# Patient Record
Sex: FEMALE | Race: WHITE | Employment: STUDENT | URBAN - METROPOLITAN AREA
[De-identification: names, ages, dates, MRNs, and addresses within clinical notes are randomized per-mention and may not be internally consistent; named-entity substitution may affect disease eponyms.]

---
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
## 2019-01-06 ENCOUNTER — HOSPITAL ENCOUNTER (EMERGENCY)
Age: 19
Discharge: HOME | End: 2019-01-06
Attending: EMERGENCY MEDICINE
Payer: COMMERCIAL

## 2019-01-06 VITALS
OXYGEN SATURATION: 99 % | TEMPERATURE: 97.7 F | HEIGHT: 69 IN | HEART RATE: 86 BPM | SYSTOLIC BLOOD PRESSURE: 118 MMHG | RESPIRATION RATE: 16 BRPM | DIASTOLIC BLOOD PRESSURE: 60 MMHG | WEIGHT: 180 LBS | BODY MASS INDEX: 26.66 KG/M2

## 2019-01-06 DIAGNOSIS — K52.9 GASTROENTERITIS: Primary | ICD-10-CM

## 2019-01-06 DIAGNOSIS — J01.90 ACUTE SINUSITIS, RECURRENCE NOT SPECIFIED, UNSPECIFIED LOCATION: ICD-10-CM

## 2019-01-06 LAB
HCG(URINE) PREGNANCY TEST: NEGATIVE
INFLUENZA A BY PCR: NOT DETECTED
INFLUENZA B BY PCR: NOT DETECTED
STREP GRP A PCR: NEGATIVE

## 2019-01-06 PROCEDURE — 87880 STREP A ASSAY W/OPTIC: CPT

## 2019-01-06 PROCEDURE — 81025 URINE PREGNANCY TEST: CPT

## 2019-01-06 PROCEDURE — 2580000003 HC RX 258: Performed by: STUDENT IN AN ORGANIZED HEALTH CARE EDUCATION/TRAINING PROGRAM

## 2019-01-06 PROCEDURE — 99283 EMERGENCY DEPT VISIT LOW MDM: CPT

## 2019-01-06 PROCEDURE — 96374 THER/PROPH/DIAG INJ IV PUSH: CPT

## 2019-01-06 PROCEDURE — 6360000002 HC RX W HCPCS: Performed by: STUDENT IN AN ORGANIZED HEALTH CARE EDUCATION/TRAINING PROGRAM

## 2019-01-06 PROCEDURE — 87502 INFLUENZA DNA AMP PROBE: CPT

## 2019-01-06 RX ORDER — ONDANSETRON 4 MG/1
4 TABLET, ORALLY DISINTEGRATING ORAL 3 TIMES DAILY PRN
Qty: 21 TABLET | Refills: 0 | Status: SHIPPED | OUTPATIENT
Start: 2019-01-06 | End: 2019-08-16

## 2019-01-06 RX ORDER — PSEUDOEPHEDRINE HYDROCHLORIDE 30 MG/1
30 TABLET ORAL EVERY 4 HOURS PRN
Qty: 30 TABLET | Refills: 1 | Status: SHIPPED | OUTPATIENT
Start: 2019-01-06 | End: 2019-08-16

## 2019-01-06 RX ORDER — ONDANSETRON 2 MG/ML
4 INJECTION INTRAMUSCULAR; INTRAVENOUS ONCE
Status: COMPLETED | OUTPATIENT
Start: 2019-01-06 | End: 2019-01-06

## 2019-01-06 RX ADMIN — ONDANSETRON 4 MG: 2 INJECTION INTRAMUSCULAR; INTRAVENOUS at 18:50

## 2019-01-06 RX ADMIN — SODIUM CHLORIDE 1000 ML: 9 INJECTION, SOLUTION INTRAVENOUS at 18:49

## 2019-01-06 ASSESSMENT — ENCOUNTER SYMPTOMS
DIARRHEA: 1
SINUS PRESSURE: 1
SORE THROAT: 0
ABDOMINAL PAIN: 0
SINUS PAIN: 1
SHORTNESS OF BREATH: 0
NAUSEA: 1
COUGH: 0
CONSTIPATION: 0
CHEST TIGHTNESS: 0
VOMITING: 1

## 2019-01-06 ASSESSMENT — PAIN SCALES - GENERAL: PAINLEVEL_OUTOF10: 5

## 2019-03-08 ENCOUNTER — OFFICE VISIT (OUTPATIENT)
Dept: PRIMARY CARE CLINIC | Age: 19
End: 2019-03-08
Payer: COMMERCIAL

## 2019-03-08 VITALS
WEIGHT: 189 LBS | OXYGEN SATURATION: 98 % | HEART RATE: 72 BPM | TEMPERATURE: 97.6 F | DIASTOLIC BLOOD PRESSURE: 80 MMHG | SYSTOLIC BLOOD PRESSURE: 126 MMHG | BODY MASS INDEX: 27.91 KG/M2

## 2019-03-08 DIAGNOSIS — M54.41 ACUTE BILATERAL LOW BACK PAIN WITH BILATERAL SCIATICA: Primary | ICD-10-CM

## 2019-03-08 DIAGNOSIS — M54.42 ACUTE BILATERAL LOW BACK PAIN WITH BILATERAL SCIATICA: Primary | ICD-10-CM

## 2019-03-08 PROCEDURE — 99213 OFFICE O/P EST LOW 20 MIN: CPT | Performed by: FAMILY MEDICINE

## 2019-03-08 PROCEDURE — G8484 FLU IMMUNIZE NO ADMIN: HCPCS | Performed by: FAMILY MEDICINE

## 2019-03-08 PROCEDURE — G8419 CALC BMI OUT NRM PARAM NOF/U: HCPCS | Performed by: FAMILY MEDICINE

## 2019-03-08 PROCEDURE — 1036F TOBACCO NON-USER: CPT | Performed by: FAMILY MEDICINE

## 2019-03-08 PROCEDURE — G8427 DOCREV CUR MEDS BY ELIG CLIN: HCPCS | Performed by: FAMILY MEDICINE

## 2019-03-08 ASSESSMENT — PATIENT HEALTH QUESTIONNAIRE - PHQ9
1. LITTLE INTEREST OR PLEASURE IN DOING THINGS: 1
SUM OF ALL RESPONSES TO PHQ QUESTIONS 1-9: 2
SUM OF ALL RESPONSES TO PHQ QUESTIONS 1-9: 2
2. FEELING DOWN, DEPRESSED OR HOPELESS: 1
SUM OF ALL RESPONSES TO PHQ9 QUESTIONS 1 & 2: 2

## 2019-03-08 ASSESSMENT — ENCOUNTER SYMPTOMS: BACK PAIN: 1

## 2019-04-09 RX ORDER — NORETHINDRONE ACETATE AND ETHINYL ESTRADIOL AND FERROUS FUMARATE 1MG-20(21)
1 KIT ORAL DAILY
Qty: 1 PACKET | Refills: 3 | Status: SHIPPED
Start: 2019-04-09 | End: 2021-04-23 | Stop reason: SDUPTHER

## 2019-04-25 ENCOUNTER — TELEPHONE (OUTPATIENT)
Dept: PRIMARY CARE CLINIC | Age: 19
End: 2019-04-25
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
## 2019-06-18 ENCOUNTER — NURSE ONLY (OUTPATIENT)
Dept: PRIMARY CARE CLINIC | Age: 19
End: 2019-06-18
Payer: COMMERCIAL

## 2019-06-18 DIAGNOSIS — Z11.1 SCREENING FOR TUBERCULOSIS: Primary | ICD-10-CM

## 2019-06-18 PROCEDURE — 86580 TB INTRADERMAL TEST: CPT | Performed by: FAMILY MEDICINE

## 2019-06-20 LAB
INDURATION: NORMAL
TB SKIN TEST: NEGATIVE

## 2019-08-15 ENCOUNTER — TELEPHONE (OUTPATIENT)
Dept: ADMINISTRATIVE | Age: 19
End: 2019-08-15

## 2019-08-15 NOTE — TELEPHONE ENCOUNTER
Pt made an appt on 08/20 as this was the first available, she has had bruising on legs and a few on her arms. She would like to know if she should come in sooner as she and her family are a little concerned as to why. 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 Please advise.

## 2019-08-16 ENCOUNTER — OFFICE VISIT (OUTPATIENT)
Dept: PRIMARY CARE CLINIC | Age: 19
End: 2019-08-16
Payer: COMMERCIAL

## 2019-08-16 ENCOUNTER — HOSPITAL ENCOUNTER (OUTPATIENT)
Age: 19
Discharge: HOME | End: 2019-08-18
Payer: COMMERCIAL

## 2019-08-16 VITALS
DIASTOLIC BLOOD PRESSURE: 66 MMHG | BODY MASS INDEX: 26.59 KG/M2 | HEIGHT: 69 IN | OXYGEN SATURATION: 99 % | WEIGHT: 179.5 LBS | SYSTOLIC BLOOD PRESSURE: 104 MMHG | HEART RATE: 82 BPM | TEMPERATURE: 97.6 F

## 2019-08-16 DIAGNOSIS — S00.03XD CONTUSION OF SCALP, SUBSEQUENT ENCOUNTER: ICD-10-CM

## 2019-08-16 DIAGNOSIS — G44.319 ACUTE POST-TRAUMATIC HEADACHE, NOT INTRACTABLE: Primary | ICD-10-CM

## 2019-08-16 DIAGNOSIS — R58 ECCHYMOSIS: ICD-10-CM

## 2019-08-16 PROCEDURE — 99214 OFFICE O/P EST MOD 30 MIN: CPT | Performed by: FAMILY MEDICINE

## 2019-08-16 PROCEDURE — G8427 DOCREV CUR MEDS BY ELIG CLIN: HCPCS | Performed by: FAMILY MEDICINE

## 2019-08-16 PROCEDURE — G8419 CALC BMI OUT NRM PARAM NOF/U: HCPCS | Performed by: FAMILY MEDICINE

## 2019-08-16 PROCEDURE — 1036F TOBACCO NON-USER: CPT | Performed by: FAMILY MEDICINE

## 2019-08-16 PROCEDURE — 85027 COMPLETE CBC AUTOMATED: CPT

## 2019-08-16 RX ORDER — HYDROXYZINE PAMOATE 25 MG/1
CAPSULE ORAL
COMMUNITY
End: 2020-03-21 | Stop reason: ALTCHOICE

## 2019-08-17 LAB
HCT VFR BLD CALC: 42.1 % (ref 34–48)
HEMOGLOBIN: 13.3 G/DL (ref 11.5–15.5)
MCH RBC QN AUTO: 30.6 PG (ref 26–35)
MCHC RBC AUTO-ENTMCNC: 31.6 % (ref 32–34.5)
MCV RBC AUTO: 97 FL (ref 80–99.9)
PDW BLD-RTO: 12.3 FL (ref 11.5–15)
PLATELET # BLD: 221 E9/L (ref 130–450)
PMV BLD AUTO: 10.8 FL (ref 7–12)
RBC # BLD: 4.34 E12/L (ref 3.5–5.5)
WBC # BLD: 5.6 E9/L (ref 4.5–11.5)

## 2019-09-05 ENCOUNTER — TELEPHONE (OUTPATIENT)
Dept: PRIMARY CARE CLINIC | Age: 19
End: 2019-09-05

## 2019-09-05 NOTE — TELEPHONE ENCOUNTER
Pt called back and stated that the letter must specifically state she can participate in soft ball and lifting.      Letter to be faxed to [phone]

## 2019-09-05 NOTE — TELEPHONE ENCOUNTER
Patient needs a letter faxed to her school to be released to play sports. She had a back injury and her  needs this in order for her to be able to play. Patient will call with fax number.

## 2019-09-05 NOTE — LETTER
[facility]  [address] [place] [address]  Phone: [phone]  Fax: [phone]    Magali Daniel DO        September 5, 2019      To whom it may concern:    Marylee Minister is released for participation in sports.       Sincerely,        Magali Daniel DO

## 2019-09-05 NOTE — LETTER
[facility]  [address] [place] [address]  Phone: [phone]  Fax: [phone]    Ayde Bello DO        September 5, 2019      To whom it may concern:    Skyla Garcia is cleared to participate in softball and lifting.       Sincerely,        Ayde Bello DO

## 2019-09-23 ENCOUNTER — OFFICE VISIT (OUTPATIENT)
Dept: PRIMARY CARE CLINIC | Age: 19
End: 2019-09-23
Payer: COMMERCIAL

## 2019-09-23 VITALS
HEART RATE: 87 BPM | BODY MASS INDEX: 26.73 KG/M2 | DIASTOLIC BLOOD PRESSURE: 78 MMHG | SYSTOLIC BLOOD PRESSURE: 116 MMHG | OXYGEN SATURATION: 99 % | WEIGHT: 181 LBS | TEMPERATURE: 98.3 F

## 2019-09-23 DIAGNOSIS — R11.2 INTRACTABLE VOMITING WITH NAUSEA, UNSPECIFIED VOMITING TYPE: Primary | ICD-10-CM

## 2019-09-23 PROCEDURE — 99213 OFFICE O/P EST LOW 20 MIN: CPT | Performed by: FAMILY MEDICINE

## 2019-09-23 PROCEDURE — G8419 CALC BMI OUT NRM PARAM NOF/U: HCPCS | Performed by: FAMILY MEDICINE

## 2019-09-23 PROCEDURE — 1036F TOBACCO NON-USER: CPT | Performed by: FAMILY MEDICINE

## 2019-09-23 PROCEDURE — G8427 DOCREV CUR MEDS BY ELIG CLIN: HCPCS | Performed by: FAMILY MEDICINE

## 2019-09-23 RX ORDER — SUCRALFATE 1 G/1
1 TABLET ORAL 4 TIMES DAILY
Qty: 60 TABLET | Refills: 0 | Status: SHIPPED
Start: 2019-09-23 | End: 2020-03-21 | Stop reason: ALTCHOICE

## 2019-09-23 RX ORDER — OMEPRAZOLE 20 MG/1
20 CAPSULE, DELAYED RELEASE ORAL DAILY
Qty: 30 CAPSULE | Refills: 0 | Status: SHIPPED
Start: 2019-09-23 | End: 2020-03-21 | Stop reason: ALTCHOICE

## 2019-09-23 RX ORDER — ONDANSETRON 4 MG/1
4 TABLET, ORALLY DISINTEGRATING ORAL 3 TIMES DAILY PRN
Qty: 30 TABLET | Refills: 0 | Status: SHIPPED
Start: 2019-09-23 | End: 2020-10-02

## 2019-09-23 ASSESSMENT — ENCOUNTER SYMPTOMS
ABDOMINAL PAIN: 0
SORE THROAT: 0
VOMITING: 1
NAUSEA: 1
CHANGE IN BOWEL HABIT: 0

## 2020-03-21 ENCOUNTER — HOSPITAL ENCOUNTER (EMERGENCY)
Age: 20
Discharge: HOME | End: 2020-03-22
Attending: EMERGENCY MEDICINE
Payer: COMMERCIAL

## 2020-03-21 ENCOUNTER — APPOINTMENT (OUTPATIENT)
Dept: GENERAL RADIOLOGY | Age: 20
End: 2020-03-21
Payer: COMMERCIAL

## 2020-03-21 LAB
ANION GAP SERPL CALCULATED.3IONS-SCNC: 13 MMOL/L (ref 7–16)
BASOPHILS ABSOLUTE: 0.05 E9/L (ref 0–0.2)
BASOPHILS RELATIVE PERCENT: 0.8 % (ref 0–2)
BUN BLDV-MCNC: 12 MG/DL (ref 6–20)
CALCIUM SERPL-MCNC: 9.7 MG/DL (ref 8.6–10.2)
CHLORIDE BLD-SCNC: 96 MMOL/L (ref 98–107)
CO2: 26 MMOL/L (ref 22–29)
CREAT SERPL-MCNC: 0.8 MG/DL (ref 0.5–1)
D DIMER: <200 NG/ML DDU
EOSINOPHILS ABSOLUTE: 0.32 E9/L (ref 0.05–0.5)
EOSINOPHILS RELATIVE PERCENT: 5 % (ref 0–6)
GFR AFRICAN AMERICAN: >60
GFR NON-AFRICAN AMERICAN: >60 ML/MIN/1.73
GLUCOSE BLD-MCNC: 98 MG/DL (ref 74–99)
HCT VFR BLD CALC: 39.4 % (ref 34–48)
HEMOGLOBIN: 13.5 G/DL (ref 11.5–15.5)
IMMATURE GRANULOCYTES #: 0.03 E9/L
IMMATURE GRANULOCYTES %: 0.5 % (ref 0–5)
LYMPHOCYTES ABSOLUTE: 1.95 E9/L (ref 1.5–4)
LYMPHOCYTES RELATIVE PERCENT: 30.6 % (ref 20–42)
MCH RBC QN AUTO: 31 PG (ref 26–35)
MCHC RBC AUTO-ENTMCNC: 34.3 % (ref 32–34.5)
MCV RBC AUTO: 90.6 FL (ref 80–99.9)
MONOCYTES ABSOLUTE: 0.4 E9/L (ref 0.1–0.95)
MONOCYTES RELATIVE PERCENT: 6.3 % (ref 2–12)
NEUTROPHILS ABSOLUTE: 3.62 E9/L (ref 1.8–7.3)
NEUTROPHILS RELATIVE PERCENT: 56.8 % (ref 43–80)
PDW BLD-RTO: 11.5 FL (ref 11.5–15)
PLATELET # BLD: 244 E9/L (ref 130–450)
PMV BLD AUTO: 10.1 FL (ref 7–12)
POTASSIUM REFLEX MAGNESIUM: 4 MMOL/L (ref 3.5–5)
RBC # BLD: 4.35 E12/L (ref 3.5–5.5)
SODIUM BLD-SCNC: 135 MMOL/L (ref 132–146)
TROPONIN: <0.01 NG/ML (ref 0–0.03)
WBC # BLD: 6.4 E9/L (ref 4.5–11.5)

## 2020-03-21 PROCEDURE — 85378 FIBRIN DEGRADE SEMIQUANT: CPT

## 2020-03-21 PROCEDURE — 6370000000 HC RX 637 (ALT 250 FOR IP): Performed by: EMERGENCY MEDICINE

## 2020-03-21 PROCEDURE — 93005 ELECTROCARDIOGRAM TRACING: CPT | Performed by: EMERGENCY MEDICINE

## 2020-03-21 PROCEDURE — 84484 ASSAY OF TROPONIN QUANT: CPT

## 2020-03-21 PROCEDURE — 99285 EMERGENCY DEPT VISIT HI MDM: CPT

## 2020-03-21 PROCEDURE — 80048 BASIC METABOLIC PNL TOTAL CA: CPT

## 2020-03-21 PROCEDURE — 36415 COLL VENOUS BLD VENIPUNCTURE: CPT

## 2020-03-21 PROCEDURE — 85025 COMPLETE CBC W/AUTO DIFF WBC: CPT

## 2020-03-21 PROCEDURE — 71046 X-RAY EXAM CHEST 2 VIEWS: CPT

## 2020-03-21 RX ORDER — ASPIRIN 81 MG/1
324 TABLET, CHEWABLE ORAL ONCE
Status: COMPLETED | OUTPATIENT
Start: 2020-03-21 | End: 2020-03-21

## 2020-03-21 RX ORDER — MONTELUKAST SODIUM 10 MG/1
10 TABLET ORAL DAILY
COMMUNITY
End: 2021-11-29

## 2020-03-21 RX ADMIN — ASPIRIN 81 MG 324 MG: 81 TABLET ORAL at 23:40

## 2020-03-21 ASSESSMENT — PAIN DESCRIPTION - PAIN TYPE: TYPE: ACUTE PAIN

## 2020-03-21 ASSESSMENT — PAIN SCALES - GENERAL
PAINLEVEL_OUTOF10: 4
PAINLEVEL_OUTOF10: 5

## 2020-03-22 VITALS
TEMPERATURE: 98.2 F | HEIGHT: 69 IN | HEART RATE: 75 BPM | RESPIRATION RATE: 16 BRPM | WEIGHT: 180 LBS | BODY MASS INDEX: 26.66 KG/M2 | OXYGEN SATURATION: 100 % | DIASTOLIC BLOOD PRESSURE: 67 MMHG | SYSTOLIC BLOOD PRESSURE: 117 MMHG

## 2020-03-22 LAB
EKG ATRIAL RATE: 82 BPM
EKG P AXIS: 46 DEGREES
EKG P-R INTERVAL: 154 MS
EKG Q-T INTERVAL: 380 MS
EKG QRS DURATION: 84 MS
EKG QTC CALCULATION (BAZETT): 443 MS
EKG R AXIS: 82 DEGREES
EKG T AXIS: 47 DEGREES
EKG VENTRICULAR RATE: 82 BPM
HCG, URINE, POC: NEGATIVE
Lab: NORMAL
NEGATIVE QC PASS/FAIL: NORMAL
POSITIVE QC PASS/FAIL: NORMAL

## 2020-03-22 PROCEDURE — 93010 ELECTROCARDIOGRAM REPORT: CPT | Performed by: INTERNAL MEDICINE

## 2020-03-22 ASSESSMENT — ENCOUNTER SYMPTOMS
COLOR CHANGE: 0
ABDOMINAL PAIN: 0
SHORTNESS OF BREATH: 0
SORE THROAT: 0
COUGH: 0

## 2020-03-22 ASSESSMENT — PAIN SCALES - GENERAL
PAINLEVEL_OUTOF10: 2
PAINLEVEL_OUTOF10: 4

## 2020-03-22 NOTE — ED PROVIDER NOTES
Bethany Lopez is a 20 y.o. female presenting to the emergency department via walk in for Chest Pain. Patient reports intermittent right-sided chest pain over the past 3 days. Patient complains of associated palpitations. These episodes last for approximately 2 to 3 seconds as patient has been documenting multiple episodes on her phone. Patient admits to recent travel from [place]. Patient denies any infectious symptoms including fever, chills, cough or exposure to COVID. She admits she has also been drinking soda that contains caffeine. Patient has positive family history, as her mother at bedside admits she had an ablation performed as she had similar episodes. Mother denies family history of sudden cardiac death. The history is provided by the patient. Chest Pain   Pain location:  R chest  Pain severity:  Mild  Timing:  Intermittent  Chronicity:  New  Relieved by:  Rest  Worsened by:  Nothing  Associated symptoms: no abdominal pain, no cough, no fever, no headache and no shortness of breath         Review of Systems   Constitutional: Negative for chills and fever. HENT: Negative for congestion and sore throat. Eyes: Negative for visual disturbance. Respiratory: Negative for cough and shortness of breath. Cardiovascular: Positive for chest pain. Negative for leg swelling. Gastrointestinal: Negative for abdominal pain. Genitourinary: Negative for difficulty urinating and menstrual problem. Musculoskeletal: Negative for gait problem and neck pain. Skin: Negative for color change. Neurological: Negative for headaches. Psychiatric/Behavioral: Negative for confusion. Physical Exam  Vitals signs and nursing note reviewed. Constitutional:       General: She is not in acute distress. HENT:      Head: Normocephalic and atraumatic. Nose: Nose normal.      Mouth/Throat:      Mouth: Mucous membranes are moist.   Eyes:      General: No scleral icterus. ---------------------------------------------------    LABS:  Results for orders placed or performed during the hospital encounter of 03/21/20   CBC auto differential   Result Value Ref Range    WBC 6.4 4.5 - 11.5 E9/L    RBC 4.35 3.50 - 5.50 E12/L    Hemoglobin 13.5 11.5 - 15.5 g/dL    Hematocrit 39.4 34.0 - 48.0 %    MCV 90.6 80.0 - 99.9 fL    MCH 31.0 26.0 - 35.0 pg    MCHC 34.3 32.0 - 34.5 %    RDW 11.5 11.5 - 15.0 fL    Platelets 331 564 - 332 E9/L    MPV 10.1 7.0 - 12.0 fL    Neutrophils % 56.8 43.0 - 80.0 %    Immature Granulocytes % 0.5 0.0 - 5.0 %    Lymphocytes % 30.6 20.0 - 42.0 %    Monocytes % 6.3 2.0 - 12.0 %    Eosinophils % 5.0 0.0 - 6.0 %    Basophils % 0.8 0.0 - 2.0 %    Neutrophils Absolute 3.62 1.80 - 7.30 E9/L    Immature Granulocytes # 0.03 E9/L    Lymphocytes Absolute 1.95 1.50 - 4.00 E9/L    Monocytes Absolute 0.40 0.10 - 0.95 E9/L    Eosinophils Absolute 0.32 0.05 - 0.50 E9/L    Basophils Absolute 0.05 0.00 - 0.20 Q5/V   Basic Metabolic Panel w/ Reflex to MG   Result Value Ref Range    Sodium 135 132 - 146 mmol/L    Potassium reflex Magnesium 4.0 3.5 - 5.0 mmol/L    Chloride 96 (L) 98 - 107 mmol/L    CO2 26 22 - 29 mmol/L    Anion Gap 13 7 - 16 mmol/L    Glucose 98 74 - 99 mg/dL    BUN 12 6 - 20 mg/dL    CREATININE 0.8 0.5 - 1.0 mg/dL    GFR Non-African American >60 >=60 mL/min/1.73    GFR African American >60     Calcium 9.7 8.6 - 10.2 mg/dL   Troponin   Result Value Ref Range    Troponin <0.01 0.00 - 0.03 ng/mL   D-Dimer, Quantitative   Result Value Ref Range    D-Dimer, Quant <200 ng/mL DDU   POC Pregnancy Urine Qual   Result Value Ref Range    HCG, Urine, POC Negative Negative    Lot Number [id number]     Positive QC Pass/Fail Pass     Negative QC Pass/Fail Pass    EKG 12 Lead   Result Value Ref Range    Ventricular Rate 82 BPM    Atrial Rate 82 BPM    P-R Interval 154 ms    QRS Duration 84 ms    Q-T Interval 380 ms    QTc Calculation (Bazett) 443 ms    P Axis 46 degrees    R Axis 82

## 2020-03-23 ENCOUNTER — TELEPHONE (OUTPATIENT)
Dept: PRIMARY CARE CLINIC | Age: 20
End: 2020-03-23

## 2020-03-23 NOTE — TELEPHONE ENCOUNTER
Patients mother asked that you call her regarding Monik's heart palpitations and follow up from this weekend. Please call.

## 2020-04-14 ENCOUNTER — TELEPHONE (OUTPATIENT)
Dept: PRIMARY CARE CLINIC | Age: 20
End: 2020-04-14

## 2020-04-15 NOTE — TELEPHONE ENCOUNTER
Spoke with patient's mother. They are unhappy with the care from the office staff regarding her medication refills that she is getting at her psychiatrist.  She is stable on her Lamictal and Seroquel and would like me to handle refills and seeing her. I told her that as long she is stable on these medications we can do this. However if things change she will need to go back to the psychiatrist.  I told her I need to do a virtual visit or office visit with her to get things documented regarding her psychiatric care.

## 2020-04-16 ENCOUNTER — TELEPHONE (OUTPATIENT)
Dept: ADMINISTRATIVE | Age: 20
End: 2020-04-16

## 2020-04-16 ENCOUNTER — VIRTUAL VISIT (OUTPATIENT)
Dept: PRIMARY CARE CLINIC | Age: 20
End: 2020-04-16
Payer: COMMERCIAL

## 2020-04-16 PROCEDURE — 1036F TOBACCO NON-USER: CPT | Performed by: FAMILY MEDICINE

## 2020-04-16 PROCEDURE — 99213 OFFICE O/P EST LOW 20 MIN: CPT | Performed by: FAMILY MEDICINE

## 2020-04-16 PROCEDURE — G8419 CALC BMI OUT NRM PARAM NOF/U: HCPCS | Performed by: FAMILY MEDICINE

## 2020-04-16 PROCEDURE — G8427 DOCREV CUR MEDS BY ELIG CLIN: HCPCS | Performed by: FAMILY MEDICINE

## 2020-04-16 RX ORDER — LAMOTRIGINE 150 MG/1
150 TABLET ORAL 2 TIMES DAILY
Qty: 180 TABLET | Refills: 0 | Status: SHIPPED
Start: 2020-04-16 | End: 2020-10-08 | Stop reason: SDUPTHER

## 2020-04-16 RX ORDER — QUETIAPINE FUMARATE 300 MG/1
300 TABLET, FILM COATED ORAL NIGHTLY
COMMUNITY
Start: 2019-11-27 | End: 2020-04-16 | Stop reason: SDUPTHER

## 2020-04-16 RX ORDER — QUETIAPINE FUMARATE 300 MG/1
300 TABLET, FILM COATED ORAL NIGHTLY
Qty: 90 TABLET | Refills: 0 | Status: SHIPPED
Start: 2020-04-16 | End: 2020-07-20 | Stop reason: SDUPTHER

## 2020-04-16 ASSESSMENT — PATIENT HEALTH QUESTIONNAIRE - PHQ9
1. LITTLE INTEREST OR PLEASURE IN DOING THINGS: 0
SUM OF ALL RESPONSES TO PHQ QUESTIONS 1-9: 0
SUM OF ALL RESPONSES TO PHQ9 QUESTIONS 1 & 2: 0
2. FEELING DOWN, DEPRESSED OR HOPELESS: 0
SUM OF ALL RESPONSES TO PHQ QUESTIONS 1-9: 0

## 2020-04-16 NOTE — PROGRESS NOTES
for sleep disturbance. Negative for agitation, decreased concentration, dysphoric mood and suicidal ideas. The patient is not nervous/anxious. other review of systems reviewed and are negative    OBJECTIVE:  There were no vitals taken for this visit. Physical Exam  Constitutional:       General: She is not in acute distress. Appearance: Normal appearance. She is not ill-appearing, toxic-appearing or diaphoretic. HENT:      Head: Normocephalic. Eyes:      General: No scleral icterus. Pulmonary:      Effort: Pulmonary effort is normal.   Neurological:      Mental Status: She is alert and oriented to person, place, and time. Psychiatric:         Mood and Affect: Mood normal.         ASSESSMENT AND PLAN:    Anu Maier was seen today for depression and anxiety. Diagnoses and all orders for this visit:    GALILEO (generalized anxiety disorder)  -     QUEtiapine (SEROQUEL) 300 MG tablet; Take 1 tablet by mouth nightly  -     lamoTRIgine (LAMICTAL) 150 MG tablet; Take 1 tablet by mouth 2 times daily    Recurrent major depressive disorder, in remission (HCC)  -     QUEtiapine (SEROQUEL) 300 MG tablet; Take 1 tablet by mouth nightly  -     lamoTRIgine (LAMICTAL) 150 MG tablet; Take 1 tablet by mouth 2 times daily        Return in about 3 months (around 7/16/2020). Breanna Ocampo DO  TeleMedicine Patient Consent    This visit was performed as a virtual video visit using a synchronous, two-way, audio-video telehealth technology platform. Patient identification was verified at the start of the visit, including the patient's telephone number and physical location. I discussed with the patient the nature of our telehealth visits, that:     1. Due to the nature of an audio- video modality, the only components of a physical exam that could be done are the elements supported by direct observation. 2. I would evaluate the patient and recommend diagnostics and treatments based on my assessment.   3. If it was

## 2020-06-17 ENCOUNTER — TELEPHONE (OUTPATIENT)
Dept: ADMINISTRATIVE | Age: 20
End: 2020-06-17

## 2020-06-17 NOTE — TELEPHONE ENCOUNTER
Pt's mom called b/c pt possibly has strep. Advised to go to [facility] to be tested. Information given on location and closing time. Agreed & expressed understanding.

## 2020-07-20 RX ORDER — QUETIAPINE FUMARATE 300 MG/1
300 TABLET, FILM COATED ORAL NIGHTLY
Qty: 90 TABLET | Refills: 0 | Status: SHIPPED
Start: 2020-07-20 | End: 2020-07-24 | Stop reason: SDUPTHER

## 2020-07-24 ENCOUNTER — TELEPHONE (OUTPATIENT)
Dept: PRIMARY CARE CLINIC | Age: 20
End: 2020-07-24

## 2020-07-24 RX ORDER — QUETIAPINE FUMARATE 300 MG/1
300 TABLET, FILM COATED ORAL NIGHTLY
Qty: 7 TABLET | Refills: 0 | Status: SHIPPED
Start: 2020-07-24 | End: 2020-10-08 | Stop reason: SDUPTHER

## 2020-07-24 NOTE — TELEPHONE ENCOUNTER
Patient is still waiting for her seroquel prescription there was a mix up with the mail away. She is out and asking if a short supply could be sent in to [facility]. She would need this before 4 today if possible because she has to work night shift.

## 2020-10-02 ENCOUNTER — APPOINTMENT (OUTPATIENT)
Dept: GENERAL RADIOLOGY | Age: 20
End: 2020-10-02
Payer: COMMERCIAL

## 2020-10-02 ENCOUNTER — APPOINTMENT (OUTPATIENT)
Dept: CT IMAGING | Age: 20
End: 2020-10-02
Payer: COMMERCIAL

## 2020-10-02 ENCOUNTER — HOSPITAL ENCOUNTER (EMERGENCY)
Age: 20
Discharge: HOME | End: 2020-10-02
Payer: COMMERCIAL

## 2020-10-02 VITALS
BODY MASS INDEX: 25.18 KG/M2 | RESPIRATION RATE: 14 BRPM | DIASTOLIC BLOOD PRESSURE: 74 MMHG | HEIGHT: 69 IN | WEIGHT: 170 LBS | HEART RATE: 91 BPM | TEMPERATURE: 97.9 F | SYSTOLIC BLOOD PRESSURE: 115 MMHG | OXYGEN SATURATION: 98 %

## 2020-10-02 LAB
ALBUMIN SERPL-MCNC: 4.2 G/DL (ref 3.5–5.2)
ALP BLD-CCNC: 87 U/L (ref 35–104)
ALT SERPL-CCNC: 12 U/L (ref 0–32)
ANION GAP SERPL CALCULATED.3IONS-SCNC: 7 MMOL/L (ref 7–16)
AST SERPL-CCNC: 14 U/L (ref 0–31)
BASOPHILS ABSOLUTE: 0.06 E9/L (ref 0–0.2)
BASOPHILS RELATIVE PERCENT: 1.3 % (ref 0–2)
BILIRUB SERPL-MCNC: 0.3 MG/DL (ref 0–1.2)
BILIRUBIN URINE: NEGATIVE
BLOOD, URINE: NEGATIVE
BUN BLDV-MCNC: 6 MG/DL (ref 6–20)
CALCIUM SERPL-MCNC: 9.5 MG/DL (ref 8.6–10.2)
CHLORIDE BLD-SCNC: 103 MMOL/L (ref 98–107)
CLARITY: CLEAR
CO2: 28 MMOL/L (ref 22–29)
COLOR: YELLOW
CREAT SERPL-MCNC: 0.7 MG/DL (ref 0.5–1)
D DIMER: <200 NG/ML DDU
EKG ATRIAL RATE: 91 BPM
EKG P AXIS: 73 DEGREES
EKG P-R INTERVAL: 156 MS
EKG Q-T INTERVAL: 382 MS
EKG QRS DURATION: 86 MS
EKG QTC CALCULATION (BAZETT): 469 MS
EKG R AXIS: 87 DEGREES
EKG T AXIS: 60 DEGREES
EKG VENTRICULAR RATE: 91 BPM
EOSINOPHILS ABSOLUTE: 0.35 E9/L (ref 0.05–0.5)
EOSINOPHILS RELATIVE PERCENT: 7.3 % (ref 0–6)
GFR AFRICAN AMERICAN: >60
GFR NON-AFRICAN AMERICAN: >60 ML/MIN/1.73
GLUCOSE BLD-MCNC: 86 MG/DL (ref 74–99)
GLUCOSE URINE: NEGATIVE MG/DL
HCG, URINE, POC: NEGATIVE
HCT VFR BLD CALC: 37.7 % (ref 34–48)
HEMOGLOBIN: 12.9 G/DL (ref 11.5–15.5)
IMMATURE GRANULOCYTES #: 0.01 E9/L
IMMATURE GRANULOCYTES %: 0.2 % (ref 0–5)
KETONES, URINE: NEGATIVE MG/DL
LEUKOCYTE ESTERASE, URINE: NEGATIVE
LIPASE: 32 U/L (ref 13–60)
LYMPHOCYTES ABSOLUTE: 1.44 E9/L (ref 1.5–4)
LYMPHOCYTES RELATIVE PERCENT: 30.1 % (ref 20–42)
Lab: NORMAL
MCH RBC QN AUTO: 31.5 PG (ref 26–35)
MCHC RBC AUTO-ENTMCNC: 34.2 % (ref 32–34.5)
MCV RBC AUTO: 92 FL (ref 80–99.9)
MONOCYTES ABSOLUTE: 0.4 E9/L (ref 0.1–0.95)
MONOCYTES RELATIVE PERCENT: 8.4 % (ref 2–12)
NEGATIVE QC PASS/FAIL: NORMAL
NEUTROPHILS ABSOLUTE: 2.53 E9/L (ref 1.8–7.3)
NEUTROPHILS RELATIVE PERCENT: 52.7 % (ref 43–80)
NITRITE, URINE: NEGATIVE
PDW BLD-RTO: 11.9 FL (ref 11.5–15)
PH UA: 6 (ref 5–9)
PLATELET # BLD: 226 E9/L (ref 130–450)
PMV BLD AUTO: 9.6 FL (ref 7–12)
POSITIVE QC PASS/FAIL: NORMAL
POTASSIUM SERPL-SCNC: 3.7 MMOL/L (ref 3.5–5)
PROTEIN UA: NEGATIVE MG/DL
RBC # BLD: 4.1 E12/L (ref 3.5–5.5)
SODIUM BLD-SCNC: 138 MMOL/L (ref 132–146)
SPECIFIC GRAVITY UA: <=1.005 (ref 1–1.03)
TOTAL PROTEIN: 7.1 G/DL (ref 6.4–8.3)
TROPONIN: <0.01 NG/ML (ref 0–0.03)
UROBILINOGEN, URINE: 0.2 E.U./DL
WBC # BLD: 4.8 E9/L (ref 4.5–11.5)

## 2020-10-02 PROCEDURE — 83690 ASSAY OF LIPASE: CPT

## 2020-10-02 PROCEDURE — 96375 TX/PRO/DX INJ NEW DRUG ADDON: CPT

## 2020-10-02 PROCEDURE — 93005 ELECTROCARDIOGRAM TRACING: CPT | Performed by: EMERGENCY MEDICINE

## 2020-10-02 PROCEDURE — 71046 X-RAY EXAM CHEST 2 VIEWS: CPT

## 2020-10-02 PROCEDURE — 6370000000 HC RX 637 (ALT 250 FOR IP): Performed by: PHYSICIAN ASSISTANT

## 2020-10-02 PROCEDURE — 85025 COMPLETE CBC W/AUTO DIFF WBC: CPT

## 2020-10-02 PROCEDURE — 99284 EMERGENCY DEPT VISIT MOD MDM: CPT

## 2020-10-02 PROCEDURE — 6360000004 HC RX CONTRAST MEDICATION: Performed by: RADIOLOGY

## 2020-10-02 PROCEDURE — 36415 COLL VENOUS BLD VENIPUNCTURE: CPT

## 2020-10-02 PROCEDURE — 93010 ELECTROCARDIOGRAM REPORT: CPT | Performed by: INTERNAL MEDICINE

## 2020-10-02 PROCEDURE — 6360000002 HC RX W HCPCS: Performed by: PHYSICIAN ASSISTANT

## 2020-10-02 PROCEDURE — 85378 FIBRIN DEGRADE SEMIQUANT: CPT

## 2020-10-02 PROCEDURE — 80053 COMPREHEN METABOLIC PANEL: CPT

## 2020-10-02 PROCEDURE — 96374 THER/PROPH/DIAG INJ IV PUSH: CPT

## 2020-10-02 PROCEDURE — 2580000003 HC RX 258: Performed by: PHYSICIAN ASSISTANT

## 2020-10-02 PROCEDURE — 71275 CT ANGIOGRAPHY CHEST: CPT

## 2020-10-02 PROCEDURE — 81003 URINALYSIS AUTO W/O SCOPE: CPT

## 2020-10-02 PROCEDURE — 2500000003 HC RX 250 WO HCPCS: Performed by: PHYSICIAN ASSISTANT

## 2020-10-02 PROCEDURE — 74177 CT ABD & PELVIS W/CONTRAST: CPT

## 2020-10-02 PROCEDURE — 84484 ASSAY OF TROPONIN QUANT: CPT

## 2020-10-02 RX ORDER — METOCLOPRAMIDE HYDROCHLORIDE 5 MG/ML
10 INJECTION INTRAMUSCULAR; INTRAVENOUS ONCE
Status: COMPLETED | OUTPATIENT
Start: 2020-10-02 | End: 2020-10-02

## 2020-10-02 RX ORDER — HYOSCYAMINE SULFATE 0.125 MG
125 TABLET ORAL EVERY 4 HOURS PRN
Qty: 60 TABLET | Refills: 0 | Status: SHIPPED | OUTPATIENT
Start: 2020-10-02 | End: 2021-11-29

## 2020-10-02 RX ORDER — MORPHINE SULFATE 4 MG/ML
4 INJECTION, SOLUTION INTRAMUSCULAR; INTRAVENOUS ONCE
Status: COMPLETED | OUTPATIENT
Start: 2020-10-02 | End: 2020-10-02

## 2020-10-02 RX ORDER — SUCRALFATE 1 G/1
1 TABLET ORAL 4 TIMES DAILY
Qty: 120 TABLET | Refills: 3 | Status: SHIPPED | OUTPATIENT
Start: 2020-10-02 | End: 2021-11-29

## 2020-10-02 RX ORDER — PANTOPRAZOLE SODIUM 40 MG/1
40 TABLET, DELAYED RELEASE ORAL DAILY
Qty: 30 TABLET | Refills: 2 | Status: SHIPPED | OUTPATIENT
Start: 2020-10-02 | End: 2021-11-29

## 2020-10-02 RX ORDER — ONDANSETRON 2 MG/ML
4 INJECTION INTRAMUSCULAR; INTRAVENOUS ONCE
Status: COMPLETED | OUTPATIENT
Start: 2020-10-02 | End: 2020-10-02

## 2020-10-02 RX ORDER — DICYCLOMINE HYDROCHLORIDE 10 MG/1
20 CAPSULE ORAL ONCE
Status: DISCONTINUED | OUTPATIENT
Start: 2020-10-02 | End: 2020-10-02

## 2020-10-02 RX ORDER — DICYCLOMINE HYDROCHLORIDE 10 MG/1
20 CAPSULE ORAL
Qty: 60 CAPSULE | Refills: 1 | Status: SHIPPED | OUTPATIENT
Start: 2020-10-02 | End: 2020-10-02

## 2020-10-02 RX ORDER — 0.9 % SODIUM CHLORIDE 0.9 %
1000 INTRAVENOUS SOLUTION INTRAVENOUS ONCE
Status: COMPLETED | OUTPATIENT
Start: 2020-10-02 | End: 2020-10-02

## 2020-10-02 RX ORDER — KETOROLAC TROMETHAMINE 30 MG/ML
30 INJECTION, SOLUTION INTRAMUSCULAR; INTRAVENOUS ONCE
Status: COMPLETED | OUTPATIENT
Start: 2020-10-02 | End: 2020-10-02

## 2020-10-02 RX ORDER — ONDANSETRON 4 MG/1
4 TABLET, ORALLY DISINTEGRATING ORAL EVERY 8 HOURS PRN
Qty: 24 TABLET | Refills: 0 | Status: SHIPPED | OUTPATIENT
Start: 2020-10-02 | End: 2021-11-29

## 2020-10-02 RX ADMIN — KETOROLAC TROMETHAMINE 30 MG: 30 INJECTION, SOLUTION INTRAMUSCULAR; INTRAVENOUS at 16:48

## 2020-10-02 RX ADMIN — FAMOTIDINE 20 MG: 10 INJECTION, SOLUTION INTRAVENOUS at 16:49

## 2020-10-02 RX ADMIN — LIDOCAINE HYDROCHLORIDE: 20 SOLUTION ORAL; TOPICAL at 20:08

## 2020-10-02 RX ADMIN — SODIUM CHLORIDE 1000 ML: 9 INJECTION, SOLUTION INTRAVENOUS at 16:46

## 2020-10-02 RX ADMIN — MORPHINE SULFATE 4 MG: 4 INJECTION, SOLUTION INTRAMUSCULAR; INTRAVENOUS at 17:56

## 2020-10-02 RX ADMIN — METOCLOPRAMIDE 10 MG: 5 INJECTION, SOLUTION INTRAMUSCULAR; INTRAVENOUS at 20:23

## 2020-10-02 RX ADMIN — ONDANSETRON 4 MG: 2 INJECTION INTRAMUSCULAR; INTRAVENOUS at 16:47

## 2020-10-02 RX ADMIN — IOPAMIDOL 80 ML: 755 INJECTION, SOLUTION INTRAVENOUS at 19:05

## 2020-10-02 ASSESSMENT — PAIN DESCRIPTION - LOCATION: LOCATION: RIB CAGE;CHEST;BACK

## 2020-10-02 ASSESSMENT — PAIN DESCRIPTION - ORIENTATION: ORIENTATION: UPPER

## 2020-10-02 ASSESSMENT — PAIN SCALES - GENERAL
PAINLEVEL_OUTOF10: 8

## 2020-10-02 ASSESSMENT — PAIN DESCRIPTION - PAIN TYPE: TYPE: ACUTE PAIN

## 2020-10-02 NOTE — ED PROVIDER NOTES
[facility]     Department of Emergency Medicine   ED  Provider Note  Admit Date/RoomTime: 10/2/2020  3:50 PM  ED Room: [facility]    Chief Complaint      Chest Pain (bilateral ribs, upper back, and chest pain. worse with movement and inspiration.) and Rib Pain    History of Present Illness      Andrea Wakefield is a 23 y.o. old female who presents to the ED for epigastric abdominal pain, bilateral rib pain/chest pain, and upper back pain that has been ongoing for the past several days. She states pain is worse with movement and inspiration. She denies any injury or trauma. Patient states she has an abdominal history of IBS but has not followed with a gastroenterologist for several years. She is not currently on any medications for her stomach. She states she frequently gets nauseous after she eats. She does occasionally vomit. She does have a surgical history of cholecystectomy and appendectomy. Handy Wilcox denies any heart or lung problems. She denies fever/chills, diarrhea, headache, dizziness, cough, congestion, rash, urinary complaints, limb weakness, numbness/tingling, or recent travel. She is alert and oriented x3 and in no apparent distress at this exam. Patient is nontoxic appearing. Patient is drinking pop at this exam.     PERC Rule for PE:    Age ? 50 negative     HR ? 100 negative     O2 Sat on Room Air < 95% negative     Prior History of DVT/PE negative     Recent Trauma or Surgery negative     Hemoptysis negative     Exogenous Estrogen/Hormone Use negative     Unilateral Extremity Swelling negative     * If ANY Criteria are positive, the PERC rule is not satisfied and cannot be used to rule out PE in this patient. ROS   Pertinent positives and negatives are stated within HPI, all other systems reviewed and are negative. Past Medical History:  has a past medical history of Abdominal pain, Anxiety, Asthma, Back pain, Depression, Headache, Insomnia, and Irritable bowel syndrome.     Past Surgical History:  has a past surgical history that includes Finger replantation; Colonoscopy; laparoscopic appendectomy (N/A, 12/4/2018); and Cholecystectomy, laparoscopic (N/A, 12/4/2018). Social History:  reports that she is a non-smoker but has been exposed to tobacco smoke. She has never used smokeless tobacco. She reports that she does not drink alcohol or use drugs. Family History: family history includes Arthritis in her mother; Depression in her mother. The patients home medications have been reviewed. Allergies: Cat hair extract; Eggs or egg-derived products; Horse-derived products; Other; Pcn [penicillins]; Seasonal; Blueberry [vaccinium angustifolium]; and Food  Allergies have been reviewed with patient. Physical Exam   VS:  /74   Pulse 91   Temp 97.9 °F (36.6 °C) (Oral)   Resp 14   Ht 5' 9\" (1.753 m)   Wt 170 lb (77.1 kg)   SpO2 98%   BMI 25.10 kg/m²      Oxygen Saturation Interpretation: Normal.    General Appearance/Constitutional:  Alert and oriented x4, NAD. HEENT:  NC/NT. EOMI, Airway patent. No pharyngeal erythema, uvula midline     Neck:  Supple. No lymphadenopathy. No meningeal signs    Respiratoty: Lung sounds clear and equal bilaterally with good air flow, No respiratory distress, unlabored breathing   CV:  Regular rate and rhythm  Chest: No chest or rib tenderness, no crepitus, no bruising or rash    Abdomen:  Soft, epigastric tenderness, generalized abdominal pain, no guarding or rigidity, No firm or pulsatile mass. Integument:  Normal turgor. Warm, dry, without visible rash. Extremities/Lymphatics:  No tenderness or edema bilaterally   Neurological:  CN II-XII grossly intact, Motor functions intact.     Lab / Imaging Results   (All laboratory and radiology results have been personally reviewed by myself)  Labs:  Results for orders placed or performed during the hospital encounter of 10/02/20   CBC Auto Differential   Result Value Ref Range    WBC 4.8 4.5 - 11.5 E9/L    RBC 4.10 3.50 - 5.50 E12/L    Hemoglobin 12.9 11.5 - 15.5 g/dL    Hematocrit 37.7 34.0 - 48.0 %    MCV 92.0 80.0 - 99.9 fL    MCH 31.5 26.0 - 35.0 pg    MCHC 34.2 32.0 - 34.5 %    RDW 11.9 11.5 - 15.0 fL    Platelets 240 736 - 895 E9/L    MPV 9.6 7.0 - 12.0 fL    Neutrophils % 52.7 43.0 - 80.0 %    Immature Granulocytes % 0.2 0.0 - 5.0 %    Lymphocytes % 30.1 20.0 - 42.0 %    Monocytes % 8.4 2.0 - 12.0 %    Eosinophils % 7.3 (H) 0.0 - 6.0 %    Basophils % 1.3 0.0 - 2.0 %    Neutrophils Absolute 2.53 1.80 - 7.30 E9/L    Immature Granulocytes # 0.01 E9/L    Lymphocytes Absolute 1.44 (L) 1.50 - 4.00 E9/L    Monocytes Absolute 0.40 0.10 - 0.95 E9/L    Eosinophils Absolute 0.35 0.05 - 0.50 E9/L    Basophils Absolute 0.06 0.00 - 0.20 E9/L   Comprehensive Metabolic Panel   Result Value Ref Range    Sodium 138 132 - 146 mmol/L    Potassium 3.7 3.5 - 5.0 mmol/L    Chloride 103 98 - 107 mmol/L    CO2 28 22 - 29 mmol/L    Anion Gap 7 7 - 16 mmol/L    Glucose 86 74 - 99 mg/dL    BUN 6 6 - 20 mg/dL    CREATININE 0.7 0.5 - 1.0 mg/dL    GFR Non-African American >60 >=60 mL/min/1.73    GFR African American >60     Calcium 9.5 8.6 - 10.2 mg/dL    Total Protein 7.1 6.4 - 8.3 g/dL    Alb 4.2 3.5 - 5.2 g/dL    Total Bilirubin 0.3 0.0 - 1.2 mg/dL    Alkaline Phosphatase 87 35 - 104 U/L    ALT 12 0 - 32 U/L    AST 14 0 - 31 U/L   Troponin   Result Value Ref Range    Troponin <0.01 0.00 - 0.03 ng/mL   D-Dimer, Quantitative   Result Value Ref Range    D-Dimer, Quant <200 ng/mL DDU   Urinalysis   Result Value Ref Range    Color, UA Yellow Straw/Yellow    Clarity, UA Clear Clear    Glucose, Ur Negative Negative mg/dL    Bilirubin Urine Negative Negative    Ketones, Urine Negative Negative mg/dL    Specific Gravity, UA <=1.005 1.005 - 1.030    Blood, Urine Negative Negative    pH, UA 6.0 5.0 - 9.0    Protein, UA Negative Negative mg/dL    Urobilinogen, Urine 0.2 <2.0 E.U./dL    Nitrite, Urine Negative Negative    Leukocyte Esterase, Urine Negative Negative   Lipase   Result Value Ref Range    Lipase 32 13 - 60 U/L   POC Pregnancy Urine Qual   Result Value Ref Range    HCG, Urine, POC Negative Negative    Lot Number [id number]     Positive QC Pass/Fail Pass     Negative QC Pass/Fail Pass    EKG 12 Lead   Result Value Ref Range    Ventricular Rate 91 BPM    Atrial Rate 91 BPM    P-R Interval 156 ms    QRS Duration 86 ms    Q-T Interval 382 ms    QTc Calculation (Bazett) 469 ms    P Axis 73 degrees    R Axis 87 degrees    T Axis 60 degrees     Imaging: All Radiology results interpreted by Radiologist unless otherwise noted. CT ABDOMEN PELVIS W IV CONTRAST Additional Contrast? None   Final Result   1. Unremarkable enhanced CT scan of the abdomen pelvis. Specifically, there   is no inflammatory process,  free air or fluid collection. 2.  Previous cholecystectomy and appendectomy         CTA CHEST W CONTRAST   Final Result   No evidence of pulmonary embolism or acute pulmonary abnormality. There is no acute upper abdominal pathology. XR CHEST (2 VW)   Final Result   No evidence pneumonia or pleural effusion. EKG #1:  Interpreted by emergency department physician unless otherwise noted. Time:  1356    Rate: 91  QT/QTc: 382/469   Rhythm: Sinus and occasional PVCs. Interpretation: no acute changes. Comparison: stable as compared to patient's most recent EKG.     ED Course / Medical Decision Making     Medications   famotidine (PEPCID) injection 20 mg (20 mg Intravenous Given 10/2/20 1649)   ketorolac (TORADOL) injection 30 mg (30 mg Intravenous Given 10/2/20 1648)   ondansetron (ZOFRAN) injection 4 mg (4 mg Intravenous Given 10/2/20 1647)   0.9 % sodium chloride bolus (0 mLs Intravenous Stopped 10/2/20 2012)   morphine sulfate (PF) injection 4 mg (4 mg Intravenous Given 10/2/20 1756)   iopamidol (ISOVUE-370) 76 % injection 80 mL (80 mLs Intravenous Given 10/2/20 1905)   aluminum & magnesium hydroxide-simethicone (MAALOX) 30 mL, lidocaine viscous hcl (XYLOCAINE) 5 mL (GI COCKTAIL) ( Oral Given 10/2/20 2008)   metoclopramide (REGLAN) injection 10 mg (10 mg Intravenous Given 10/2/20 2023)     Patient refused Bentyl. Re-Evaluations:    Patients symptoms are improving. She is resting comfortably in bed with no distress, playing a game on her phone. Patient aware of all lab and imaging results. She states she is here with her girlfriend who is also being seen for urinary tract infection symptoms and diarrhea. Patient is denying diarrhea. Patient got nauseous after GI cocktail, orders initiated. She refused Bentyl. I advised patient that I cannot give her any more narcotic pain medication as she has to  her and her girlfriend home from the ED. Consultations:  None    Procedures:  None    MDM:    Counseling:   I have spoken with the patient/caregiver and discussed todays results, in addition to providing specific details for the plan of care and counseling regarding the diagnosis and prognosis and are agreeable with the plan. All results reviewed. Pt understands they must follow-up with PCP and gastroenterology. They were advised to return to ED if symptoms worsen or new symptoms develop. Pt remained nontoxic, non-hypoxic, afebrile, and A&O x4 during this ED visit. They agreed with plan of care, discharge, and importance of follow-up. Pt was in no distress at discharge. Vitals stable. They were educated on newly prescribed medications. [place] diet     Assessment      1. Abdominal pain, epigastric    2. Nausea    3. Rib pain      This patient's ED course included: a personal history and physicial examination, multiple bedside re-evaluations, IV medications, cardiac monitoring and continuous pulse oximetry     This patient has remained hemodynamically stable during their ED course. Plan   Discharge to home. Patient condition is good.     New Medications     New Prescriptions    HYOSCYAMINE ([place] Villavicencio) 125 MCG TABLET    Take 1 tablet by mouth every 4 hours as needed for Cramping    ONDANSETRON (ZOFRAN ODT) 4 MG DISINTEGRATING TABLET    Take 1 tablet by mouth every 8 hours as needed for Nausea or Vomiting    PANTOPRAZOLE (PROTONIX) 40 MG TABLET    Take 1 tablet by mouth daily    SUCRALFATE (CARAFATE) 1 GM TABLET    Take 1 tablet by mouth 4 times daily Half hour before food intake     Electronically signed by Winnie Seip, PA-C   DD: 10/2/20    **This report was transcribed using voice recognition software. Every effort was made to ensure accuracy; however, inadvertent computerized transcription errors may be present.         Winnie Seip, PA-C  10/02/20 2030

## 2020-10-03 NOTE — ED NOTES
Discharge instructions given, medications and follow up instructions reviewed. Patient verbalized understanding, no other noted or stated problems at this time. Patient will follow up with physicians as directed.         Delmer Garcia RN  10/02/20 [id number]

## 2020-10-08 ENCOUNTER — OFFICE VISIT (OUTPATIENT)
Dept: PRIMARY CARE CLINIC | Age: 20
End: 2020-10-08
Payer: COMMERCIAL

## 2020-10-08 VITALS
DIASTOLIC BLOOD PRESSURE: 64 MMHG | OXYGEN SATURATION: 97 % | HEART RATE: 101 BPM | TEMPERATURE: 97.3 F | SYSTOLIC BLOOD PRESSURE: 100 MMHG | HEIGHT: 69 IN | WEIGHT: 183 LBS | BODY MASS INDEX: 27.11 KG/M2

## 2020-10-08 PROBLEM — F33.40 RECURRENT MAJOR DEPRESSIVE DISORDER, IN REMISSION (HCC): Status: ACTIVE | Noted: 2020-10-08

## 2020-10-08 PROCEDURE — G8419 CALC BMI OUT NRM PARAM NOF/U: HCPCS | Performed by: FAMILY MEDICINE

## 2020-10-08 PROCEDURE — G8427 DOCREV CUR MEDS BY ELIG CLIN: HCPCS | Performed by: FAMILY MEDICINE

## 2020-10-08 PROCEDURE — 99213 OFFICE O/P EST LOW 20 MIN: CPT | Performed by: FAMILY MEDICINE

## 2020-10-08 PROCEDURE — 1036F TOBACCO NON-USER: CPT | Performed by: FAMILY MEDICINE

## 2020-10-08 PROCEDURE — G8484 FLU IMMUNIZE NO ADMIN: HCPCS | Performed by: FAMILY MEDICINE

## 2020-10-08 RX ORDER — LAMOTRIGINE 150 MG/1
150 TABLET ORAL 2 TIMES DAILY
Qty: 14 TABLET | Refills: 0 | Status: SHIPPED
Start: 2020-10-08 | End: 2021-01-25 | Stop reason: SDUPTHER

## 2020-10-08 RX ORDER — LAMOTRIGINE 150 MG/1
150 TABLET ORAL 2 TIMES DAILY
Qty: 180 TABLET | Refills: 1 | Status: SHIPPED
Start: 2020-10-08 | End: 2020-10-08 | Stop reason: SDUPTHER

## 2020-10-08 RX ORDER — QUETIAPINE FUMARATE 300 MG/1
300 TABLET, FILM COATED ORAL NIGHTLY
Qty: 180 TABLET | Refills: 1 | Status: SHIPPED
Start: 2020-10-08 | End: 2020-10-08 | Stop reason: SDUPTHER

## 2020-10-08 RX ORDER — LAMOTRIGINE 150 MG/1
150 TABLET ORAL 2 TIMES DAILY
Qty: 180 TABLET | Refills: 1 | Status: SHIPPED
Start: 2020-10-08 | End: 2021-01-29

## 2020-10-08 RX ORDER — QUETIAPINE FUMARATE 300 MG/1
300 TABLET, FILM COATED ORAL NIGHTLY
Qty: 7 TABLET | Refills: 0 | Status: SHIPPED
Start: 2020-10-08 | End: 2020-11-02

## 2020-10-08 RX ORDER — QUETIAPINE FUMARATE 300 MG/1
300 TABLET, FILM COATED ORAL NIGHTLY
Qty: 180 TABLET | Refills: 1 | Status: SHIPPED
Start: 2020-10-08 | End: 2020-11-02 | Stop reason: SDUPTHER

## 2020-10-08 ASSESSMENT — ENCOUNTER SYMPTOMS
SHORTNESS OF BREATH: 0
CONSTIPATION: 1
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
## 2020-11-02 RX ORDER — QUETIAPINE FUMARATE 300 MG/1
300 TABLET, FILM COATED ORAL NIGHTLY
Qty: 180 TABLET | Refills: 0 | Status: SHIPPED
Start: 2020-11-02 | End: 2020-11-09 | Stop reason: SDUPTHER

## 2020-11-09 RX ORDER — QUETIAPINE FUMARATE 300 MG/1
300 TABLET, FILM COATED ORAL NIGHTLY
Qty: 7 TABLET | Refills: 0 | Status: SHIPPED
Start: 2020-11-09 | End: 2020-11-16 | Stop reason: SDUPTHER

## 2020-11-16 RX ORDER — QUETIAPINE FUMARATE 300 MG/1
300 TABLET, FILM COATED ORAL NIGHTLY
Qty: 7 TABLET | Refills: 0 | Status: SHIPPED
Start: 2020-11-16 | End: 2021-02-04 | Stop reason: SDUPTHER

## 2021-01-25 DIAGNOSIS — F41.1 GAD (GENERALIZED ANXIETY DISORDER): ICD-10-CM

## 2021-01-25 DIAGNOSIS — F33.40 RECURRENT MAJOR DEPRESSIVE DISORDER, IN REMISSION (HCC): ICD-10-CM

## 2021-01-25 RX ORDER — LAMOTRIGINE 150 MG/1
150 TABLET ORAL 2 TIMES DAILY
Qty: 14 TABLET | Refills: 0 | Status: SHIPPED
Start: 2021-01-25 | End: 2021-01-29 | Stop reason: SDUPTHER

## 2021-01-29 DIAGNOSIS — F33.40 RECURRENT MAJOR DEPRESSIVE DISORDER, IN REMISSION (HCC): ICD-10-CM

## 2021-01-29 DIAGNOSIS — F41.1 GAD (GENERALIZED ANXIETY DISORDER): ICD-10-CM

## 2021-01-29 RX ORDER — LAMOTRIGINE 150 MG/1
150 TABLET ORAL 2 TIMES DAILY
Qty: 180 TABLET | Refills: 0 | Status: SHIPPED
Start: 2021-01-29 | End: 2021-02-04 | Stop reason: SDUPTHER

## 2021-02-04 DIAGNOSIS — F33.40 RECURRENT MAJOR DEPRESSIVE DISORDER, IN REMISSION (HCC): ICD-10-CM

## 2021-02-04 DIAGNOSIS — F41.1 GAD (GENERALIZED ANXIETY DISORDER): ICD-10-CM

## 2021-02-04 RX ORDER — LAMOTRIGINE 150 MG/1
150 TABLET ORAL 2 TIMES DAILY
Qty: 180 TABLET | Refills: 0 | Status: SHIPPED
Start: 2021-02-04 | End: 2021-02-11 | Stop reason: SDUPTHER

## 2021-02-04 RX ORDER — QUETIAPINE FUMARATE 300 MG/1
300 TABLET, FILM COATED ORAL NIGHTLY
Qty: 30 TABLET | Refills: 0 | Status: SHIPPED
Start: 2021-02-04 | End: 2021-02-11 | Stop reason: SDUPTHER

## 2021-02-11 DIAGNOSIS — F33.40 RECURRENT MAJOR DEPRESSIVE DISORDER, IN REMISSION (HCC): ICD-10-CM

## 2021-02-11 DIAGNOSIS — F41.1 GAD (GENERALIZED ANXIETY DISORDER): ICD-10-CM

## 2021-02-11 RX ORDER — LAMOTRIGINE 150 MG/1
150 TABLET ORAL 2 TIMES DAILY
Qty: 28 TABLET | Refills: 0 | Status: SHIPPED
Start: 2021-02-11 | End: 2021-02-26 | Stop reason: SDUPTHER

## 2021-02-11 RX ORDER — QUETIAPINE FUMARATE 300 MG/1
300 TABLET, FILM COATED ORAL NIGHTLY
Qty: 30 TABLET | Refills: 0 | OUTPATIENT
Start: 2021-02-11

## 2021-02-11 RX ORDER — QUETIAPINE FUMARATE 300 MG/1
300 TABLET, FILM COATED ORAL NIGHTLY
Qty: 90 TABLET | Refills: 0 | Status: SHIPPED
Start: 2021-02-11 | End: 2021-02-15 | Stop reason: SDUPTHER

## 2021-02-11 RX ORDER — QUETIAPINE FUMARATE 300 MG/1
300 TABLET, FILM COATED ORAL NIGHTLY
Qty: 15 TABLET | Refills: 0 | Status: SHIPPED
Start: 2021-02-11 | End: 2021-02-26 | Stop reason: SDUPTHER

## 2021-02-11 RX ORDER — LAMOTRIGINE 150 MG/1
150 TABLET ORAL 2 TIMES DAILY
Qty: 180 TABLET | Refills: 0 | Status: SHIPPED
Start: 2021-02-11 | End: 2021-02-15 | Stop reason: SDUPTHER

## 2021-02-15 ENCOUNTER — VIRTUAL VISIT (OUTPATIENT)
Dept: PRIMARY CARE CLINIC | Age: 21
End: 2021-02-15
Payer: COMMERCIAL

## 2021-02-15 DIAGNOSIS — F41.1 GAD (GENERALIZED ANXIETY DISORDER): ICD-10-CM

## 2021-02-15 DIAGNOSIS — F33.40 RECURRENT MAJOR DEPRESSIVE DISORDER, IN REMISSION (HCC): ICD-10-CM

## 2021-02-15 PROCEDURE — 99213 OFFICE O/P EST LOW 20 MIN: CPT | Performed by: FAMILY MEDICINE

## 2021-02-15 PROCEDURE — G8427 DOCREV CUR MEDS BY ELIG CLIN: HCPCS | Performed by: FAMILY MEDICINE

## 2021-02-15 PROCEDURE — G8419 CALC BMI OUT NRM PARAM NOF/U: HCPCS | Performed by: FAMILY MEDICINE

## 2021-02-15 PROCEDURE — 1036F TOBACCO NON-USER: CPT | Performed by: FAMILY MEDICINE

## 2021-02-15 PROCEDURE — G8484 FLU IMMUNIZE NO ADMIN: HCPCS | Performed by: FAMILY MEDICINE

## 2021-02-15 RX ORDER — QUETIAPINE FUMARATE 300 MG/1
300 TABLET, FILM COATED ORAL NIGHTLY
Qty: 90 TABLET | Refills: 1 | Status: SHIPPED
Start: 2021-02-15 | End: 2021-07-06 | Stop reason: SDUPTHER

## 2021-02-15 RX ORDER — LAMOTRIGINE 150 MG/1
150 TABLET ORAL 2 TIMES DAILY
Qty: 180 TABLET | Refills: 1 | Status: SHIPPED
Start: 2021-02-15 | End: 2021-04-23 | Stop reason: SDUPTHER

## 2021-02-15 ASSESSMENT — ENCOUNTER SYMPTOMS: ABDOMINAL DISTENTION: 0
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
## 2021-02-25 DIAGNOSIS — F33.40 RECURRENT MAJOR DEPRESSIVE DISORDER, IN REMISSION (HCC): ICD-10-CM

## 2021-02-25 DIAGNOSIS — F41.1 GAD (GENERALIZED ANXIETY DISORDER): ICD-10-CM

## 2021-02-25 RX ORDER — QUETIAPINE FUMARATE 300 MG/1
300 TABLET, FILM COATED ORAL NIGHTLY
Qty: 15 TABLET | Refills: 0 | OUTPATIENT
Start: 2021-02-25

## 2021-02-25 RX ORDER — LAMOTRIGINE 150 MG/1
150 TABLET ORAL 2 TIMES DAILY
Qty: 28 TABLET | Refills: 0 | OUTPATIENT
Start: 2021-02-25

## 2021-02-26 DIAGNOSIS — F41.1 GAD (GENERALIZED ANXIETY DISORDER): ICD-10-CM

## 2021-02-26 DIAGNOSIS — F33.40 RECURRENT MAJOR DEPRESSIVE DISORDER, IN REMISSION (HCC): ICD-10-CM

## 2021-02-26 RX ORDER — LAMOTRIGINE 150 MG/1
150 TABLET ORAL 2 TIMES DAILY
Qty: 28 TABLET | Refills: 0 | Status: SHIPPED
Start: 2021-02-26 | End: 2021-04-23 | Stop reason: SDUPTHER

## 2021-02-26 RX ORDER — QUETIAPINE FUMARATE 300 MG/1
300 TABLET, FILM COATED ORAL NIGHTLY
Qty: 15 TABLET | Refills: 0 | Status: SHIPPED
Start: 2021-02-26 | End: 2022-01-10

## 2021-04-15 ENCOUNTER — TELEPHONE (OUTPATIENT)
Dept: PRIMARY CARE CLINIC | Age: 21
End: 2021-04-15
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
## 2021-04-16 NOTE — TELEPHONE ENCOUNTER
Patient called and wanted you to call her. She states she never received a call and doesn't have a voicemail from our office.

## 2021-04-19 NOTE — TELEPHONE ENCOUNTER
Spoke with patient several days ago. Let her know if she is feeling dizzy to be evaluated for this. Especially if she feels like she may pass out. Happened to speak with her father today who states that she is on a certain diet which he is concerned is not getting her enough nutrition. I would be concerned about this as well. Also she states she is burning the candle at both ends with school and softball.

## 2021-04-23 ENCOUNTER — OFFICE VISIT (OUTPATIENT)
Dept: PRIMARY CARE CLINIC | Age: 21
End: 2021-04-23
Payer: COMMERCIAL

## 2021-04-23 VITALS
OXYGEN SATURATION: 97 % | WEIGHT: 200 LBS | BODY MASS INDEX: 29.62 KG/M2 | HEIGHT: 69 IN | DIASTOLIC BLOOD PRESSURE: 66 MMHG | HEART RATE: 115 BPM | TEMPERATURE: 97.7 F | SYSTOLIC BLOOD PRESSURE: 118 MMHG

## 2021-04-23 DIAGNOSIS — H61.22 IMPACTED CERUMEN OF LEFT EAR: ICD-10-CM

## 2021-04-23 DIAGNOSIS — R00.0 TACHYCARDIA: Primary | ICD-10-CM

## 2021-04-23 DIAGNOSIS — F33.40 RECURRENT MAJOR DEPRESSIVE DISORDER, IN REMISSION (HCC): ICD-10-CM

## 2021-04-23 DIAGNOSIS — F41.1 GAD (GENERALIZED ANXIETY DISORDER): ICD-10-CM

## 2021-04-23 DIAGNOSIS — R51.9 SINUS HEADACHE: ICD-10-CM

## 2021-04-23 DIAGNOSIS — R09.81 CHRONIC NASAL CONGESTION: ICD-10-CM

## 2021-04-23 PROCEDURE — 99214 OFFICE O/P EST MOD 30 MIN: CPT | Performed by: FAMILY MEDICINE

## 2021-04-23 PROCEDURE — 93000 ELECTROCARDIOGRAM COMPLETE: CPT | Performed by: FAMILY MEDICINE

## 2021-04-23 PROCEDURE — 1036F TOBACCO NON-USER: CPT | Performed by: FAMILY MEDICINE

## 2021-04-23 PROCEDURE — G8419 CALC BMI OUT NRM PARAM NOF/U: HCPCS | Performed by: FAMILY MEDICINE

## 2021-04-23 PROCEDURE — G8427 DOCREV CUR MEDS BY ELIG CLIN: HCPCS | Performed by: FAMILY MEDICINE

## 2021-04-23 RX ORDER — NORETHINDRONE ACETATE AND ETHINYL ESTRADIOL AND FERROUS FUMARATE 1MG-20(21)
1 KIT ORAL DAILY
Qty: 3 PACKET | Refills: 1 | Status: SHIPPED
Start: 2021-04-23 | End: 2021-09-08

## 2021-04-23 RX ORDER — LAMOTRIGINE 150 MG/1
150 TABLET ORAL 2 TIMES DAILY
Qty: 180 TABLET | Refills: 1 | Status: SHIPPED
Start: 2021-04-23 | End: 2021-11-29

## 2021-04-23 ASSESSMENT — ENCOUNTER SYMPTOMS
SORE THROAT: 0
SINUS PAIN: 0
SINUS PRESSURE: 1
SHORTNESS OF BREATH: 0
COUGH: 0
RHINORRHEA: 0
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
## 2021-07-06 DIAGNOSIS — F33.40 RECURRENT MAJOR DEPRESSIVE DISORDER, IN REMISSION (HCC): ICD-10-CM

## 2021-07-06 DIAGNOSIS — F41.1 GAD (GENERALIZED ANXIETY DISORDER): ICD-10-CM

## 2021-07-06 RX ORDER — QUETIAPINE FUMARATE 300 MG/1
300 TABLET, FILM COATED ORAL NIGHTLY
Qty: 7 TABLET | Refills: 0 | Status: SHIPPED
Start: 2021-07-06 | End: 2021-11-29 | Stop reason: SDUPTHER

## 2021-07-09 ENCOUNTER — TELEPHONE (OUTPATIENT)
Dept: PRIMARY CARE CLINIC | Age: 21
End: 2021-07-09

## 2021-07-10 DIAGNOSIS — S42.409D CLOSED FRACTURE OF ELBOW WITH ROUTINE HEALING, UNSPECIFIED LATERALITY, SUBSEQUENT ENCOUNTER: Primary | ICD-10-CM

## 2021-07-10 DIAGNOSIS — S42.409D CLOSED FRACTURE OF ELBOW WITH ROUTINE HEALING, UNSPECIFIED LATERALITY, SUBSEQUENT ENCOUNTER: ICD-10-CM

## 2021-07-10 RX ORDER — TRAMADOL HYDROCHLORIDE 50 MG/1
50 TABLET ORAL EVERY 6 HOURS PRN
Qty: 28 TABLET | Refills: 0 | Status: SHIPPED
Start: 2021-07-10 | End: 2021-07-10 | Stop reason: SDUPTHER

## 2021-07-10 RX ORDER — TRAMADOL HYDROCHLORIDE 50 MG/1
50 TABLET ORAL EVERY 6 HOURS PRN
Qty: 28 TABLET | Refills: 0 | Status: SHIPPED
Start: 2021-07-10 | End: 2022-01-14 | Stop reason: SDUPTHER

## 2021-07-10 NOTE — PROGRESS NOTES
Monik fractured her elbow falling down stairs. In sling and pain med given at ER where they were on vacation is not working. .she is now in [facility];[place], [place].   . I will call in some Ultram 50 mg # 28, 1 tab q 6 hrs prn pain

## 2021-09-08 RX ORDER — NORETHINDRONE ACETATE AND ETHINYL ESTRADIOL AND FERROUS FUMARATE 1MG-20(21)
KIT ORAL
Qty: 84 TABLET | Refills: 1 | Status: SHIPPED
Start: 2021-09-08 | End: 2022-01-11 | Stop reason: SDUPTHER

## 2021-11-22 ENCOUNTER — TELEPHONE (OUTPATIENT)
Dept: PRIMARY CARE CLINIC | Age: 21
End: 2021-11-22

## 2021-11-22 NOTE — TELEPHONE ENCOUNTER
----- Message from Jae Martinez sent at 11/22/2021  7:28 AM EST -----  Subject: Medication Problem    QUESTIONS  Name of Medication? QUEtiapine (SEROQUEL) 300 MG tablet  Patient-reported dosage and instructions? 300MG; once daily  What question or problem do you have with the medication? Patient would   like to discuss weening off medication. Preferred [facility]acy? [facility] [address] [place] [address] phone number (if available)? [phone]  Additional Information for Provider?   ---------------------------------------------------------------------------  --------------  [address]  What is the best way for the office to contact you? OK to leave message on   voicemail  Preferred Call Back Phone Number? [phone]  ---------------------------------------------------------------------------  --------------  SCRIPT ANSWERS  Relationship to Patient?  Self

## 2021-11-22 NOTE — TELEPHONE ENCOUNTER
----- Message from Dayo Farmer sent at 11/22/2021  7:27 AM EST -----  Subject: Medication Problem    QUESTIONS  Name of Medication? lamoTRIgine (LAMICTAL) 150 MG tablet  Patient-reported dosage and instructions? 150MG; 2 daily  What question or problem do you have with the medication? Patient would   like to discuss weening off medication. [facility]? [facility] [place] [address] phone number (if available)? [phone]  Additional Information for Provider?   ---------------------------------------------------------------------------  --------------  [address]  What is the best way for the office to contact you? OK to leave message on   voicemail  Preferred Call Back Phone Number? [phone]  ---------------------------------------------------------------------------  --------------  SCRIPT ANSWERS  Relationship to Patient?  Self

## 2021-11-22 NOTE — TELEPHONE ENCOUNTER
Patient needs office visit as she missed her last appointment. She did not call to cancel either. We can address these questions at her office visit.

## 2021-11-26 DIAGNOSIS — F33.40 RECURRENT MAJOR DEPRESSIVE DISORDER, IN REMISSION (HCC): ICD-10-CM

## 2021-11-26 DIAGNOSIS — F41.1 GAD (GENERALIZED ANXIETY DISORDER): ICD-10-CM

## 2021-11-29 ENCOUNTER — VIRTUAL VISIT (OUTPATIENT)
Dept: PRIMARY CARE CLINIC | Age: 21
End: 2021-11-29
Payer: COMMERCIAL

## 2021-11-29 DIAGNOSIS — F42.2 MIXED OBSESSIONAL THOUGHTS AND ACTS: ICD-10-CM

## 2021-11-29 DIAGNOSIS — F33.40 RECURRENT MAJOR DEPRESSIVE DISORDER, IN REMISSION (HCC): Primary | ICD-10-CM

## 2021-11-29 PROCEDURE — 1036F TOBACCO NON-USER: CPT | Performed by: FAMILY MEDICINE

## 2021-11-29 PROCEDURE — G8484 FLU IMMUNIZE NO ADMIN: HCPCS | Performed by: FAMILY MEDICINE

## 2021-11-29 PROCEDURE — 99213 OFFICE O/P EST LOW 20 MIN: CPT | Performed by: FAMILY MEDICINE

## 2021-11-29 PROCEDURE — G8427 DOCREV CUR MEDS BY ELIG CLIN: HCPCS | Performed by: FAMILY MEDICINE

## 2021-11-29 PROCEDURE — G8419 CALC BMI OUT NRM PARAM NOF/U: HCPCS | Performed by: FAMILY MEDICINE

## 2021-11-29 RX ORDER — LAMOTRIGINE 150 MG/1
TABLET ORAL
Qty: 180 TABLET | Refills: 1 | Status: SHIPPED
Start: 2021-11-29 | End: 2022-05-26
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
## 2022-01-09 DIAGNOSIS — F33.40 RECURRENT MAJOR DEPRESSIVE DISORDER, IN REMISSION (HCC): ICD-10-CM

## 2022-01-09 DIAGNOSIS — F41.1 GAD (GENERALIZED ANXIETY DISORDER): ICD-10-CM

## 2022-01-10 RX ORDER — QUETIAPINE FUMARATE 300 MG/1
TABLET, FILM COATED ORAL
Qty: 90 TABLET | Refills: 0 | Status: SHIPPED
Start: 2022-01-10 | End: 2022-02-08

## 2022-01-11 RX ORDER — NORETHINDRONE ACETATE AND ETHINYL ESTRADIOL 1MG-20(21)
1 KIT ORAL DAILY
Qty: 84 TABLET | Refills: 2 | Status: SHIPPED
Start: 2022-01-11 | End: 2022-01-14 | Stop reason: SDUPTHER

## 2022-01-13 ENCOUNTER — TELEPHONE (OUTPATIENT)
Dept: PRIMARY CARE CLINIC | Age: 22
End: 2022-01-13

## 2022-01-13 ENCOUNTER — NURSE TRIAGE (OUTPATIENT)
Dept: OTHER | Facility: CLINIC | Age: 22
End: 2022-01-13
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
## 2022-01-13 NOTE — TELEPHONE ENCOUNTER
Received call from Blaze Dwyer at [facility] with [employer]. Subjective: [facility] reported patient was calling due to lightheadedness and \"feeling faint\"    LVM on verified phone number. Asked patient to call back to scheduling to be transferred to RN triage to discuss symptoms and be given recommendations on how soon to be seen. Recommended disposition: No triage completed    Care advice provided, patient verbalizes understanding; denies any other questions or concerns; instructed to call back for any new or worsening symptoms. Attention Provider: Thank you for allowing me to participate in the care of your patient. The patient was connected to triage in response to information provided to the ECC/PSC. Please do not respond through this encounter as the response is not directed to a shared pool.     Reason for Disposition   Message left on identified voicemail    Protocols used: NO CONTACT OR DUPLICATE CONTACT CALL-ADULT-[place]

## 2022-01-13 NOTE — TELEPHONE ENCOUNTER
pts mother called back to see status of this message. Told her dr is seeing pts at the moment. She is stating that [facility] is not able to sleep due to the bad head pain and wanted something called in to get her through the night.  She does have an appt tomorrow

## 2022-01-14 ENCOUNTER — OFFICE VISIT (OUTPATIENT)
Dept: PRIMARY CARE CLINIC | Age: 22
End: 2022-01-14
Payer: COMMERCIAL

## 2022-01-14 VITALS
DIASTOLIC BLOOD PRESSURE: 82 MMHG | SYSTOLIC BLOOD PRESSURE: 124 MMHG | HEART RATE: 68 BPM | WEIGHT: 187 LBS | TEMPERATURE: 98 F | OXYGEN SATURATION: 98 % | BODY MASS INDEX: 27.62 KG/M2

## 2022-01-14 DIAGNOSIS — G44.311 INTRACTABLE ACUTE POST-TRAUMATIC HEADACHE: ICD-10-CM

## 2022-01-14 DIAGNOSIS — R09.81 CHRONIC NASAL CONGESTION: ICD-10-CM

## 2022-01-14 DIAGNOSIS — R55 VASOVAGAL SYNCOPE: Primary | ICD-10-CM

## 2022-01-14 PROCEDURE — G8427 DOCREV CUR MEDS BY ELIG CLIN: HCPCS | Performed by: FAMILY MEDICINE

## 2022-01-14 PROCEDURE — G8484 FLU IMMUNIZE NO ADMIN: HCPCS | Performed by: FAMILY MEDICINE

## 2022-01-14 PROCEDURE — 99214 OFFICE O/P EST MOD 30 MIN: CPT | Performed by: FAMILY MEDICINE

## 2022-01-14 PROCEDURE — 1036F TOBACCO NON-USER: CPT | Performed by: FAMILY MEDICINE

## 2022-01-14 PROCEDURE — G8419 CALC BMI OUT NRM PARAM NOF/U: HCPCS | Performed by: FAMILY MEDICINE

## 2022-01-14 RX ORDER — TRAMADOL HYDROCHLORIDE 50 MG/1
50 TABLET ORAL EVERY 8 HOURS PRN
Qty: 15 TABLET | Refills: 0 | Status: SHIPPED | OUTPATIENT
Start: 2022-01-14 | End: 2022-01-21

## 2022-01-14 RX ORDER — NORETHINDRONE ACETATE AND ETHINYL ESTRADIOL 1MG-20(21)
1 KIT ORAL DAILY
Qty: 90 TABLET | Refills: 1 | Status: SHIPPED
Start: 2022-01-14 | End: 2022-02-08 | Stop reason: SDUPTHER

## 2022-01-14 SDOH — ECONOMIC STABILITY: FOOD INSECURITY: WITHIN THE PAST 12 MONTHS, THE FOOD YOU BOUGHT JUST DIDN'T LAST AND YOU DIDN'T HAVE MONEY TO GET MORE.: NEVER TRUE

## 2022-01-14 SDOH — ECONOMIC STABILITY: FOOD INSECURITY: WITHIN THE PAST 12 MONTHS, YOU WORRIED THAT YOUR FOOD WOULD RUN OUT BEFORE YOU GOT MONEY TO BUY MORE.: NEVER TRUE

## 2022-01-14 ASSESSMENT — PATIENT HEALTH QUESTIONNAIRE - PHQ9
SUM OF ALL RESPONSES TO PHQ QUESTIONS 1-9: 0
1. LITTLE INTEREST OR PLEASURE IN DOING THINGS: 0
SUM OF ALL RESPONSES TO PHQ QUESTIONS 1-9: 0
SUM OF ALL RESPONSES TO PHQ QUESTIONS 1-9: 0
SUM OF ALL RESPONSES TO PHQ9 QUESTIONS 1 & 2: 0
SUM OF ALL RESPONSES TO PHQ QUESTIONS 1-9: 0
2. FEELING DOWN, DEPRESSED OR HOPELESS: 0

## 2022-01-14 ASSESSMENT — ENCOUNTER SYMPTOMS
VISUAL CHANGE: 0
BOWEL INCONTINENCE: 0

## 2022-01-14 ASSESSMENT — SOCIAL DETERMINANTS OF HEALTH (SDOH): HOW HARD IS IT FOR YOU TO PAY FOR THE VERY BASICS LIKE FOOD, HOUSING, MEDICAL CARE, AND HEATING?: NOT HARD AT ALL
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
## 2022-01-18 ENCOUNTER — TELEPHONE (OUTPATIENT)
Dept: PRIMARY CARE CLINIC | Age: 22
End: 2022-01-18

## 2022-01-18 NOTE — TELEPHONE ENCOUNTER
Pt stopped in office today stating she needs a letter stating she is okay to start her allergy shots again. She went down to her allergy doctor and he stated he needs a letter before he gives them to her.

## 2022-01-18 NOTE — LETTER
[facility]  [address] [place] [address]  Phone: [phone]  Fax: [phone]    Thea Gutierrez DO        January 18, 2022      To whom it may concern:    Carito Guajardo, a patient of mine, is medically cleared to start receiving her allergy shots.       Sincerely,        Thea Gutierrez DO

## 2022-03-01 RX ORDER — NORETHINDRONE ACETATE AND ETHINYL ESTRADIOL 1MG-20(21)
1 KIT ORAL DAILY
Qty: 28 TABLET | Refills: 2 | Status: SHIPPED
Start: 2022-03-01 | End: 2022-05-27 | Stop reason: SDUPTHER

## 2022-04-26 ENCOUNTER — OFFICE VISIT (OUTPATIENT)
Dept: PRIMARY CARE CLINIC | Age: 22
End: 2022-04-26
Payer: COMMERCIAL

## 2022-04-26 VITALS
BODY MASS INDEX: 27.99 KG/M2 | RESPIRATION RATE: 14 BRPM | TEMPERATURE: 97.8 F | WEIGHT: 189 LBS | OXYGEN SATURATION: 98 % | HEIGHT: 69 IN | DIASTOLIC BLOOD PRESSURE: 74 MMHG | HEART RATE: 72 BPM | SYSTOLIC BLOOD PRESSURE: 104 MMHG

## 2022-04-26 DIAGNOSIS — Z00.00 ANNUAL PHYSICAL EXAM: Primary | ICD-10-CM

## 2022-04-26 PROCEDURE — 99395 PREV VISIT EST AGE 18-39: CPT | Performed by: FAMILY MEDICINE

## 2022-04-26 ASSESSMENT — PATIENT HEALTH QUESTIONNAIRE - PHQ9
SUM OF ALL RESPONSES TO PHQ QUESTIONS 1-9: 0
SUM OF ALL RESPONSES TO PHQ QUESTIONS 1-9: 7
6. FEELING BAD ABOUT YOURSELF - OR THAT YOU ARE A FAILURE OR HAVE LET YOURSELF OR YOUR FAMILY DOWN: 0
3. TROUBLE FALLING OR STAYING ASLEEP: 1
8. MOVING OR SPEAKING SO SLOWLY THAT OTHER PEOPLE COULD HAVE NOTICED. OR THE OPPOSITE, BEING SO FIGETY OR RESTLESS THAT YOU HAVE BEEN MOVING AROUND A LOT MORE THAN USUAL: 0
SUM OF ALL RESPONSES TO PHQ QUESTIONS 1-9: 7
SUM OF ALL RESPONSES TO PHQ QUESTIONS 1-9: 0
SUM OF ALL RESPONSES TO PHQ QUESTIONS 1-9: 7
4. FEELING TIRED OR HAVING LITTLE ENERGY: 3
SUM OF ALL RESPONSES TO PHQ QUESTIONS 1-9: 0
SUM OF ALL RESPONSES TO PHQ9 QUESTIONS 1 & 2: 0
10. IF YOU CHECKED OFF ANY PROBLEMS, HOW DIFFICULT HAVE THESE PROBLEMS MADE IT FOR YOU TO DO YOUR WORK, TAKE CARE OF THINGS AT HOME, OR GET ALONG WITH OTHER PEOPLE: 1
5. POOR APPETITE OR OVEREATING: 0
1. LITTLE INTEREST OR PLEASURE IN DOING THINGS: 0
9. THOUGHTS THAT YOU WOULD BE BETTER OFF DEAD, OR OF HURTING YOURSELF: 0
7. TROUBLE CONCENTRATING ON THINGS, SUCH AS READING THE NEWSPAPER OR WATCHING TELEVISION: 3
2. FEELING DOWN, DEPRESSED OR HOPELESS: 0
SUM OF ALL RESPONSES TO PHQ9 QUESTIONS 1 & 2: 0
SUM OF ALL RESPONSES TO PHQ QUESTIONS 1-9: 7
SUM OF ALL RESPONSES TO PHQ QUESTIONS 1-9: 0
2. FEELING DOWN, DEPRESSED OR HOPELESS: 0
1. LITTLE INTEREST OR PLEASURE IN DOING THINGS: 0

## 2022-04-26 NOTE — PROGRESS NOTES
SUBJECTIVE:    HPI: Jose Guadalupe Steele  Was seen here today for   Chief Complaint   Patient presents with    Employment Physical    .  She states they are dealing with no new issues. Moods ok but tired lately and trouble sleeping. ADHD is bad right now. Now at [address] in [address] education. Got sunburned several days ago. Going to start working at . Past Medical History:   Diagnosis Date    Abdominal pain     Anxiety     Asthma     Back pain     Depression     Headache     posterior, radiates to neck, all testing negative.  Insomnia     Irritable bowel syndrome        Past Surgical History:   Procedure Laterality Date    CHOLECYSTECTOMY, LAPAROSCOPIC N/A 12/4/2018    CHOLECYSTECTOMY LAPAROSCOPIC POSSIBLE OPEN POSSIBLE GRAM performed by Lynda Andino MD at [facility] COLONOSCOPY      x2    FINGER REPLANTATION      LAPAROSCOPIC APPENDECTOMY N/A 12/4/2018    APPENDECTOMY LAPAROSCOPIC POSSIBLE OPEN performed by [facility]da [facility]ino MD at [facility] OR       Family History   Problem Relation Age of Onset    Depression Mother     Arthritis Mother         RA       Prior to Admission medications    Medication Sig Start Date End Date Taking?  Authorizing Provider   norethindrone-ethinyl estradiol (JUNEL FE 1/20) 1-20 MG-MCG per tablet Take 1 tablet by mouth daily 3/1/22  Yes Jb Nolasco, DO   QUEtiapine (SEROQUEL) 300 MG tablet TAKE 1 TABLET NIGHTLY 2/8/22  Yes Jb Nolasco, DO   lamoTRIgine (LAMICTAL) 150 MG tablet TAKE 1 TABLET TWICE A DAY 11/29/21  Yes Jb Nolasco, DO   albuterol sulfate HFA (PROVENTIL HFA) 108 (90 Base) MCG/ACT inhaler Inhale 2 puffs into the lungs every 6 hours as needed for Wheezing 8/23/17  Yes Jb Nolasco, DO       Cat hair extract, Eggs or egg-derived products, Horse-derived products, Other, Pcn [penicillins], Seasonal, Blueberry [vaccinium angustifolium], and Food    Social History     Tobacco Use    Smoking status: Passive Smoke Exposure - Never Smoker    Smokeless tobacco: Never Used   Substance Use Topics    Alcohol use: No         Review Of Systems:    Skin: no abnormal pigmentation, rash, scaling, itching, masses, hair or nail changes  Eyes: no blurring, diplopia, or eye pain  Ears/Nose/Throat: no hearing loss, tinnitus, vertigo, nosebleed, nasal congestion, rhinorrhea, sore throat  Respiratory: no cough, pleuritic chest pain, dyspnea, or wheezing  Cardiovascular: no chest pain, angina, dyspnea on exertion, orthopnea, PND, palpitations, or claudication  Gastrointestinal: no nausea, vomiting, heartburn, diarrhea, constipation, bloating,  abdominal pain, or blood per rectum  Genitourinary: no urinary urgency, frequency, dysuria, nocturia, hesitancy, or incontinence  Musculoskeletal: no arthritis, arthralgia, myalgia, weakness, or morning stiffness  Neurologic: no paralysis, paresis, paresthesia, seizures, tremors, or headaches  Hematologic/Lymphatic/Immunologic: no anemia, abnormal bleeding/bruising, fever, chills, night sweats, swollen glands, or unexplained weight loss  Endocrine: no heat or cold intolerance and no polyphagia, polydipsia, or polyuria      OBJECTIVE:    VS: /74   Pulse 72   Temp 97.8 °F (36.6 °C)   Resp 14   Ht 5' 9\" (1.753 m)   Wt 189 lb (85.7 kg)   LMP 04/04/2022 (Exact Date)   SpO2 98%   BMI 27.91 kg/m²   General appearance: Alert, Awake, Oriented times 3, no distress  Skin: Warm and dry with erythematous macular skin color to upper chest and arms. Head: Normocephalic. No masses, lesions or tenderness noted  Eyes: Conjunctivae appear normal. PERRL  Ears: External ears normal, TM's clear and intact  Nose/Sinuses: Nares normal. Septum midline. Mucosa swollen with mucus. No drainage  Oropharynx: Oropharynx clear with no exudate seen  Neck: Neck supple. No jugular venous distension, lymphadenopathy or thyromegaly Trachea midline. No carotid bruits  Lungs: Lungs clear to auscultation bilaterally.   No rhonchi, crackles or wheezes  Heart: S1 S2  Regular rate and rhythm. No rub, murmur or gallop  Abdomen: Abdomen soft, non-tender. BS normal. No masses, organomegaly  Extremities: No edema, Peripheral pulses palpable  Musculoskeletal: Muscular strength appears intact. No joint effusion, tenderness, swelling or warmth  Neuro:  No focal motor or sensory deficits. 2+/4 L4 reflexes        ASSESSMENT/PLAN:  Cosmo John was seen today for employment physical.    Diagnoses and all orders for this visit:    Annual physical exam     - form filled out for     Return for as needed.     Tamy Nolasco, DO

## 2022-05-25 DIAGNOSIS — F41.1 GAD (GENERALIZED ANXIETY DISORDER): ICD-10-CM

## 2022-05-25 DIAGNOSIS — F33.40 RECURRENT MAJOR DEPRESSIVE DISORDER, IN REMISSION (HCC): ICD-10-CM

## 2022-05-26 RX ORDER — LAMOTRIGINE 150 MG/1
TABLET ORAL
Qty: 180 TABLET | Refills: 0 | Status: SHIPPED
Start: 2022-05-26 | End: 2022-08-16

## 2022-05-27 DIAGNOSIS — F33.40 RECURRENT MAJOR DEPRESSIVE DISORDER, IN REMISSION (HCC): ICD-10-CM

## 2022-05-27 DIAGNOSIS — F41.1 GAD (GENERALIZED ANXIETY DISORDER): ICD-10-CM

## 2022-05-27 RX ORDER — NORETHINDRONE ACETATE AND ETHINYL ESTRADIOL 1MG-20(21)
1 KIT ORAL DAILY
Qty: 84 TABLET | Refills: 1 | Status: SHIPPED | OUTPATIENT
Start: 2022-05-27

## 2022-05-27 RX ORDER — QUETIAPINE FUMARATE 300 MG/1
TABLET, FILM COATED ORAL
Qty: 90 TABLET | Refills: 0 | Status: SHIPPED
Start: 2022-05-27 | End: 2022-10-24

## 2022-07-18 ENCOUNTER — OFFICE VISIT (OUTPATIENT)
Dept: PRIMARY CARE CLINIC | Age: 22
End: 2022-07-18
Payer: COMMERCIAL

## 2022-07-18 VITALS
DIASTOLIC BLOOD PRESSURE: 80 MMHG | WEIGHT: 191 LBS | HEART RATE: 89 BPM | TEMPERATURE: 97.2 F | OXYGEN SATURATION: 98 % | SYSTOLIC BLOOD PRESSURE: 110 MMHG | BODY MASS INDEX: 28.21 KG/M2

## 2022-07-18 DIAGNOSIS — R68.89 SENSATION OF SWOLLEN THROAT: ICD-10-CM

## 2022-07-18 DIAGNOSIS — F33.40 RECURRENT MAJOR DEPRESSIVE DISORDER, IN REMISSION (HCC): Primary | ICD-10-CM

## 2022-07-18 PROCEDURE — G8427 DOCREV CUR MEDS BY ELIG CLIN: HCPCS | Performed by: FAMILY MEDICINE

## 2022-07-18 PROCEDURE — 99213 OFFICE O/P EST LOW 20 MIN: CPT | Performed by: FAMILY MEDICINE

## 2022-07-18 PROCEDURE — G8419 CALC BMI OUT NRM PARAM NOF/U: HCPCS | Performed by: FAMILY MEDICINE

## 2022-07-18 PROCEDURE — 1036F TOBACCO NON-USER: CPT | Performed by: FAMILY MEDICINE

## 2022-07-18 NOTE — PROGRESS NOTES
Alexi Mendez, a female of 20 y.o. came to the office 7/18/2022. Patient Active Problem List   Diagnosis    Recurrent major depressive disorder, in remission ([facility] [address]          HPI wt issue: has gained wt lately and not happy about it. Eating a lot more lately due it increased appetite. On Seroquel 150 mg which is for her mood. Stopped eating when had break up with girlfriend 1 yr ago which lasted 3 months. Has new girlfriend since Aug of last yr. She exercises. On bcp for cramps. Throat sens: feels like something in there. No pain with swallowing. Depression: doing well on meds. Allergies   Allergen Reactions    Cat Hair Extract Itching     Other reaction(s): Other (See Comments)  sneezing    Eggs Or Egg-Derived Products Diarrhea    Horse-Derived Products Other (See Comments) and Swelling     Sneezing    Other Itching and Other (See Comments)     Sneezing  Cats  Other reaction(s): Other See Comments  sneezing    Pcn [Penicillins]     Seasonal Itching and Other (See Comments)     sneezing    Blueberry [Vaccinium Angustifolium] Hives    Food Hives     Pesto       Current Outpatient Medications on File Prior to Visit   Medication Sig Dispense Refill    QUEtiapine (SEROQUEL) 300 MG tablet TAKE 1 TABLET NIGHTLY 90 tablet 0    norethindrone-ethinyl estradiol (JUNEL FE 1/20) 1-20 MG-MCG per tablet Take 1 tablet by mouth daily 84 tablet 1    lamoTRIgine (LAMICTAL) 150 MG tablet TAKE 1 TABLET TWICE A  tablet 0    albuterol sulfate HFA (PROVENTIL HFA) 108 (90 Base) MCG/ACT inhaler Inhale 2 puffs into the lungs every 6 hours as needed for Wheezing 1 Inhaler 5     No current facility-administered medications on file prior to visit. Review of Systems   Constitutional:  Positive for appetite change (increased). Negative for activity change and fatigue. Psychiatric/Behavioral:  Negative for agitation, decreased concentration, dysphoric mood, sleep disturbance and suicidal ideas.  The patient is not nervous/anxious. other review of systems reviewed and are negative    OBJECTIVE:  /80   Pulse 89   Temp 97.2 °F (36.2 °C)   Wt 191 lb (86.6 kg)   SpO2 98%   BMI 28.21 kg/m²      Physical Exam  Constitutional:       General: She is not in acute distress. HENT:      Mouth/Throat:      Tongue: No lesions. Palate: No mass and lesions. Pharynx: Oropharynx is clear. Tonsils: No tonsillar exudate or tonsillar abscesses. 1+ on the right. 1+ on the left. Eyes:      General: No scleral icterus. Conjunctiva/sclera: Conjunctivae normal.   Neck:      Thyroid: No thyromegaly. Cardiovascular:      Rate and Rhythm: Normal rate and regular rhythm. Heart sounds: No murmur heard. Pulmonary:      Effort: Pulmonary effort is normal.      Breath sounds: Normal breath sounds. Musculoskeletal:      Cervical back: Neck supple. Lymphadenopathy:      Cervical: No cervical adenopathy. Skin:     General: Skin is warm and dry. Neurological:      Mental Status: She is alert and oriented to person, place, and time. Psychiatric:         Mood and Affect: Mood normal.       ASSESSMENT AND PLAN:    Juanito Cameron was seen today for discuss medications and depression. Diagnoses and all orders for this visit:    Recurrent major depressive disorder, in remission ([facility] [address]    Sensation of swollen throat    - stop Junel and see if wt improves. I don't want to change psych meds without seeing a psychiatrist due to her doing well on current regimen.   - gargle warm salt water. - continue exercise and watch diet. Return if symptoms worsen or fail to improve.     Geoff Nolasco, DO

## 2022-08-03 ENCOUNTER — TELEPHONE (OUTPATIENT)
Dept: PRIMARY CARE CLINIC | Age: 22
End: 2022-08-03

## 2022-08-04 ENCOUNTER — OFFICE VISIT (OUTPATIENT)
Dept: PRIMARY CARE CLINIC | Age: 22
End: 2022-08-04
Payer: COMMERCIAL

## 2022-08-04 VITALS
HEART RATE: 79 BPM | WEIGHT: 192 LBS | BODY MASS INDEX: 28.35 KG/M2 | OXYGEN SATURATION: 97 % | SYSTOLIC BLOOD PRESSURE: 110 MMHG | DIASTOLIC BLOOD PRESSURE: 70 MMHG | TEMPERATURE: 97.2 F

## 2022-08-04 DIAGNOSIS — J01.20 ACUTE NON-RECURRENT ETHMOIDAL SINUSITIS: Primary | ICD-10-CM

## 2022-08-04 PROCEDURE — 1036F TOBACCO NON-USER: CPT | Performed by: FAMILY MEDICINE

## 2022-08-04 PROCEDURE — G8427 DOCREV CUR MEDS BY ELIG CLIN: HCPCS | Performed by: FAMILY MEDICINE

## 2022-08-04 PROCEDURE — 99213 OFFICE O/P EST LOW 20 MIN: CPT | Performed by: FAMILY MEDICINE

## 2022-08-04 PROCEDURE — G8419 CALC BMI OUT NRM PARAM NOF/U: HCPCS | Performed by: FAMILY MEDICINE

## 2022-08-04 RX ORDER — DOXYCYCLINE HYCLATE 100 MG
100 TABLET ORAL 2 TIMES DAILY
Qty: 14 TABLET | Refills: 0 | Status: SHIPPED | OUTPATIENT
Start: 2022-08-04 | End: 2022-08-11

## 2022-08-04 ASSESSMENT — ENCOUNTER SYMPTOMS
COUGH: 1
SINUS PRESSURE: 1
SORE THROAT: 0
SHORTNESS OF BREATH: 0
RHINORRHEA: 1
SINUS PAIN: 1

## 2022-08-04 NOTE — PROGRESS NOTES
Neris Rice, a female of 20 y.o. came to the office 8/4/2022. Patient Active Problem List   Diagnosis    Recurrent major depressive disorder, in remission ([address]          Sinusitis  This is a new problem. The current episode started in the past 7 days (3). The problem has been gradually worsening since onset. There has been no fever. Associated symptoms include chills, congestion, coughing (better), headaches and sinus pressure. Pertinent negatives include no ear pain, shortness of breath or sore throat. Past treatments include oral decongestants (Excedrin). The treatment provided no relief. - covid test last pm and 2 - tests today. Allergies   Allergen Reactions    Cat Hair Extract Itching     Other reaction(s): Other (See Comments)  sneezing    Eggs Or Egg-Derived Products Diarrhea    Horse-Derived Products Other (See Comments) and Swelling     Sneezing    Other Itching and Other (See Comments)     Sneezing  Cats  Other reaction(s): Other See Comments  sneezing    Pcn [Penicillins]     Seasonal Itching and Other (See Comments)     sneezing    Blueberry [Vaccinium Angustifolium] Hives    Food Hives     Pesto       Current Outpatient Medications on File Prior to Visit   Medication Sig Dispense Refill    QUEtiapine (SEROQUEL) 300 MG tablet TAKE 1 TABLET NIGHTLY 90 tablet 0    norethindrone-ethinyl estradiol (JUNEL FE 1/20) 1-20 MG-MCG per tablet Take 1 tablet by mouth daily 84 tablet 1    lamoTRIgine (LAMICTAL) 150 MG tablet TAKE 1 TABLET TWICE A  tablet 0    albuterol sulfate HFA (PROVENTIL HFA) 108 (90 Base) MCG/ACT inhaler Inhale 2 puffs into the lungs every 6 hours as needed for Wheezing 1 Inhaler 5     No current facility-administered medications on file prior to visit. Review of Systems   Constitutional:  Positive for chills. Negative for fever. HENT:  Positive for congestion, postnasal drip, rhinorrhea, sinus pressure and sinus pain. Negative for ear pain and sore throat. Respiratory:  Positive for cough (better). Negative for shortness of breath. Neurological:  Positive for headaches. other review of systems reviewed and are negative    OBJECTIVE:  /70   Pulse 79   Temp 97.2 °F (36.2 °C)   Wt 192 lb (87.1 kg)   SpO2 97%   BMI 28.35 kg/m²      Physical Exam  Constitutional:       General: She is not in acute distress. HENT:      Right Ear: Tympanic membrane normal.      Left Ear: Tympanic membrane normal.      Nose: No mucosal edema or rhinorrhea. Right Turbinates: Swollen. Left Turbinates: Swollen. Right Sinus: Maxillary sinus tenderness and frontal sinus tenderness present. Left Sinus: Maxillary sinus tenderness and frontal sinus tenderness present. Comments: Clear whitish mucus     Mouth/Throat:      Pharynx: Uvula midline. No oropharyngeal exudate or posterior oropharyngeal erythema. Cardiovascular:      Rate and Rhythm: Normal rate and regular rhythm. Pulmonary:      Effort: Pulmonary effort is normal.      Breath sounds: Normal breath sounds. Musculoskeletal:      Cervical back: Neck supple. Lymphadenopathy:      Cervical: No cervical adenopathy. ASSESSMENT AND PLAN:    Candi England was seen today for sinusitis. Diagnoses and all orders for this visit:    Acute non-recurrent ethmoidal sinusitis  -     doxycycline hyclate (VIBRA-TABS) 100 MG tablet; Take 1 tablet by mouth in the morning and 1 tablet before bedtime. Do all this for 7 days. - continue sudafed prn.  - Tyree- Synephrine sample 2 puffs tid prn   - ok rtw 8/5    Return if symptoms worsen or fail to improve.     Loida Nolasco, DO

## 2022-08-16 DIAGNOSIS — F33.40 RECURRENT MAJOR DEPRESSIVE DISORDER, IN REMISSION (HCC): ICD-10-CM

## 2022-08-16 DIAGNOSIS — F41.1 GAD (GENERALIZED ANXIETY DISORDER): ICD-10-CM

## 2022-08-16 RX ORDER — LAMOTRIGINE 150 MG/1
TABLET ORAL
Qty: 180 TABLET | Refills: 0 | Status: SHIPPED | OUTPATIENT
Start: 2022-08-16

## 2022-10-23 DIAGNOSIS — F33.40 RECURRENT MAJOR DEPRESSIVE DISORDER, IN REMISSION (HCC): ICD-10-CM

## 2022-10-23 DIAGNOSIS — F41.1 GAD (GENERALIZED ANXIETY DISORDER): ICD-10-CM

## 2022-10-24 RX ORDER — QUETIAPINE FUMARATE 300 MG/1
TABLET, FILM COATED ORAL
Qty: 90 TABLET | Refills: 0 | Status: SHIPPED | OUTPATIENT
Start: 2022-10-24

## 2022-11-10 RX ORDER — ALBUTEROL SULFATE 90 UG/1
2 AEROSOL, METERED RESPIRATORY (INHALATION) EVERY 6 HOURS PRN
Qty: 1 EACH | Refills: 1 | Status: SHIPPED | OUTPATIENT
Start: 2022-11-10

## 2022-11-10 NOTE — TELEPHONE ENCOUNTER
----- Message from Vic Ng sent at 11/10/2022 10:23 AM EST -----  Subject: Refill Request    QUESTIONS  Name of Medication? albuterol sulfate HFA (PROVENTIL HFA) 108 (90 Base)   MCG/ACT inhaler  Patient-reported dosage and instructions? albuterol sulfate HFA (PROVENTIL   HFA) 108 (90 Base) MCG/ACT inhaler4 x a day   How many days do you have left? 20  Preferred Pharmacy? [facility] #[id number]  Pharmacy phone number (if available)? [phone]  ---------------------------------------------------------------------------  --------------  CALL BACK INFO  What is the best way for the office to contact you? OK to leave message on   voicemail  Preferred Call Back Phone Number? [phone]  ---------------------------------------------------------------------------  --------------  SCRIPT ANSWERS  Relationship to Patient?  Self

## 2022-11-17 ENCOUNTER — OFFICE VISIT (OUTPATIENT)
Dept: PRIMARY CARE CLINIC | Age: 22
End: 2022-11-17
Payer: COMMERCIAL

## 2022-11-17 VITALS
BODY MASS INDEX: 27.76 KG/M2 | WEIGHT: 188 LBS | TEMPERATURE: 97.6 F | HEART RATE: 73 BPM | SYSTOLIC BLOOD PRESSURE: 122 MMHG | DIASTOLIC BLOOD PRESSURE: 80 MMHG | OXYGEN SATURATION: 99 %

## 2022-11-17 DIAGNOSIS — F33.40 RECURRENT MAJOR DEPRESSIVE DISORDER, IN REMISSION (HCC): ICD-10-CM

## 2022-11-17 DIAGNOSIS — J01.40 ACUTE NON-RECURRENT PANSINUSITIS: Primary | ICD-10-CM

## 2022-11-17 PROCEDURE — G8427 DOCREV CUR MEDS BY ELIG CLIN: HCPCS | Performed by: FAMILY MEDICINE

## 2022-11-17 PROCEDURE — 99213 OFFICE O/P EST LOW 20 MIN: CPT | Performed by: FAMILY MEDICINE

## 2022-11-17 PROCEDURE — G8484 FLU IMMUNIZE NO ADMIN: HCPCS | Performed by: FAMILY MEDICINE

## 2022-11-17 PROCEDURE — G8419 CALC BMI OUT NRM PARAM NOF/U: HCPCS | Performed by: FAMILY MEDICINE

## 2022-11-17 PROCEDURE — 1036F TOBACCO NON-USER: CPT | Performed by: FAMILY MEDICINE

## 2022-11-17 RX ORDER — AZITHROMYCIN 250 MG/1
TABLET, FILM COATED ORAL
Qty: 1 PACKET | Refills: 0 | Status: SHIPPED | OUTPATIENT
Start: 2022-11-17

## 2022-11-17 ASSESSMENT — ENCOUNTER SYMPTOMS
SORE THROAT: 1
SINUS PRESSURE: 1
RHINORRHEA: 0
COUGH: 1
SINUS PAIN: 1
WHEEZING: 1
SHORTNESS OF BREATH: 1

## 2022-11-17 NOTE — PROGRESS NOTES
Live Simmons, a female of 20 y.o. came to the office 11/17/2022. Patient Active Problem List   Diagnosis    Recurrent major depressive disorder, in remission ([facility] [address]          Cough  This is a new problem. The current episode started 1 to 4 weeks ago (2). The problem has been gradually worsening. The cough is Productive of purulent sputum (yellowish green). Associated symptoms include ear pain, nasal congestion, postnasal drip, a sore throat, shortness of breath and wheezing. Pertinent negatives include no chills, fever, headaches or rhinorrhea. She has tried a beta-agonist inhaler (needing 2 puffs bid lately) for the symptoms. The treatment provided moderate relief. Depression  Visit Type: initial  Onset of symptoms: more than 1 year ago  Progression since onset: stable  Patient presents with the following symptoms: shortness of breath. Patient is not experiencing: decreased concentration, depressed mood, feelings of hopelessness, feelings of worthlessness, insomnia, irritability, nervousness/anxiety and panic. Allergies   Allergen Reactions    Cat Hair Extract Itching     Other reaction(s): Other (See Comments)  sneezing    Eggs Or Egg-Derived Products Diarrhea    Horse-Derived Products Other (See Comments) and Swelling     Sneezing    Other Itching and Other (See Comments)     Sneezing  Cats  Other reaction(s):  Other See Comments  sneezing    Pcn [Penicillins]     Seasonal Itching and Other (See Comments)     sneezing    Blueberry [Vaccinium Angustifolium] Hives    Food Hives     Pesto       Current Outpatient Medications on File Prior to Visit   Medication Sig Dispense Refill    albuterol sulfate HFA (PROVENTIL HFA) 108 (90 Base) MCG/ACT inhaler Inhale 2 puffs into the lungs every 6 hours as needed for Wheezing 1 each 1    QUEtiapine (SEROQUEL) 300 MG tablet TAKE 1 TABLET NIGHTLY 90 tablet 0    lamoTRIgine (LAMICTAL) 150 MG tablet TAKE 1 TABLET TWICE A  tablet 0    norethindrone-ethinyl estradiol (JUNEL FE 1/20) 1-20 MG-MCG per tablet Take 1 tablet by mouth daily 84 tablet 1     No current facility-administered medications on file prior to visit. Review of Systems   Constitutional:  Negative for activity change, appetite change, chills, fatigue, fever and irritability. HENT:  Positive for ear pain, postnasal drip, sinus pressure (under eyes), sinus pain and sore throat. Negative for congestion and rhinorrhea. Respiratory:  Positive for cough, shortness of breath and wheezing. Neurological:  Negative for headaches. Psychiatric/Behavioral:  Positive for depression. Negative for agitation, decreased concentration, dysphoric mood and sleep disturbance. The patient is not nervous/anxious and does not have insomnia. other review of systems reviewed and are negative    OBJECTIVE:  /80   Pulse 73   Temp 97.6 °F (36.4 °C)   Wt 188 lb (85.3 kg)   SpO2 99%   BMI 27.76 kg/m²      Physical Exam  Constitutional:       General: She is not in acute distress. HENT:      Right Ear: Tympanic membrane normal.      Left Ear: Tympanic membrane normal.      Nose: Rhinorrhea present. No mucosal edema. Rhinorrhea is purulent. Right Turbinates: Swollen. Left Turbinates: Swollen. Right Sinus: Maxillary sinus tenderness present. No frontal sinus tenderness. Left Sinus: Maxillary sinus tenderness present. No frontal sinus tenderness. Mouth/Throat:      Pharynx: Uvula midline. No oropharyngeal exudate or posterior oropharyngeal erythema. Cardiovascular:      Rate and Rhythm: Normal rate and regular rhythm. Pulmonary:      Effort: Pulmonary effort is normal.      Breath sounds: Examination of the left-upper field reveals wheezing. Wheezing (faint end inspiration) present. Musculoskeletal:      Cervical back: Neck supple. Lymphadenopathy:      Cervical: No cervical adenopathy. ASSESSMENT AND PLAN:    Oseas was seen today for cough and congestion.     Diagnoses and all orders for this visit:    Acute non-recurrent pansinusitis  -     azithromycin (ZITHROMAX Z-CANDIE) 250 MG tablet; Take 2 pills on day 1, then 1 pill days 2-5. Recurrent major depressive disorder, in remission ([facility]    - otc sudafed tad   - continue current meds for depression. Return if symptoms worsen or fail to improve.     Farhat Nolasco, DO

## 2022-11-29 ENCOUNTER — TELEPHONE (OUTPATIENT)
Dept: PRIMARY CARE CLINIC | Age: 22
End: 2022-11-29

## 2022-11-29 RX ORDER — BROMPHENIRAMINE MALEATE, PSEUDOEPHEDRINE HYDROCHLORIDE, AND DEXTROMETHORPHAN HYDROBROMIDE 2; 30; 10 MG/5ML; MG/5ML; MG/5ML
5 SYRUP ORAL 4 TIMES DAILY PRN
Qty: 200 ML | Refills: 0 | Status: SHIPPED | OUTPATIENT
Start: 2022-11-29

## 2022-11-29 NOTE — TELEPHONE ENCOUNTER
----- Message from Ruthann Bhandari sent at 11/29/2022  2:36 PM EST -----  Subject: Message to Provider    QUESTIONS  Information for Provider? Patient was seen on 11/17/22 for upper   respiratory infection, has finished a 5 day antibiotic, and still has   cough and congestion, please contact patient to advise.   ---------------------------------------------------------------------------  --------------  Nieves Cunha [facility]  [phone]; OK to leave message on voicemail  ---------------------------------------------------------------------------  --------------  SCRIPT ANSWERS  Relationship to Patient?  Self

## 2022-12-09 ENCOUNTER — OFFICE VISIT (OUTPATIENT)
Dept: PRIMARY CARE CLINIC | Age: 22
End: 2022-12-09
Payer: COMMERCIAL

## 2022-12-09 VITALS
DIASTOLIC BLOOD PRESSURE: 68 MMHG | HEART RATE: 82 BPM | OXYGEN SATURATION: 98 % | WEIGHT: 189 LBS | BODY MASS INDEX: 27.99 KG/M2 | TEMPERATURE: 98.4 F | HEIGHT: 69 IN | SYSTOLIC BLOOD PRESSURE: 100 MMHG | RESPIRATION RATE: 16 BRPM

## 2022-12-09 DIAGNOSIS — J01.21 ACUTE RECURRENT ETHMOIDAL SINUSITIS: Primary | ICD-10-CM

## 2022-12-09 PROCEDURE — G8427 DOCREV CUR MEDS BY ELIG CLIN: HCPCS | Performed by: FAMILY MEDICINE

## 2022-12-09 PROCEDURE — 99213 OFFICE O/P EST LOW 20 MIN: CPT | Performed by: FAMILY MEDICINE

## 2022-12-09 PROCEDURE — 1036F TOBACCO NON-USER: CPT | Performed by: FAMILY MEDICINE

## 2022-12-09 PROCEDURE — G8419 CALC BMI OUT NRM PARAM NOF/U: HCPCS | Performed by: FAMILY MEDICINE

## 2022-12-09 PROCEDURE — G8484 FLU IMMUNIZE NO ADMIN: HCPCS | Performed by: FAMILY MEDICINE

## 2022-12-09 RX ORDER — DOXYCYCLINE HYCLATE 100 MG
100 TABLET ORAL 2 TIMES DAILY
Qty: 20 TABLET | Refills: 0 | Status: SHIPPED | OUTPATIENT
Start: 2022-12-09 | End: 2022-12-19

## 2022-12-09 ASSESSMENT — ENCOUNTER SYMPTOMS
COUGH: 1
SINUS PAIN: 1
SINUS COMPLAINT: 1
SHORTNESS OF BREATH: 1
SINUS PRESSURE: 0
SORE THROAT: 0
RHINORRHEA: 0
WHEEZING: 1

## 2022-12-09 NOTE — PROGRESS NOTES
Juliette Becerra, a female of 20 y.o. came to the office 12/9/2022. Patient Active Problem List   Diagnosis    Recurrent major depressive disorder, in remission [facility])          URI   This is a recurrent problem. The current episode started 1 to 4 weeks ago. Associated symptoms include congestion, coughing, sinus pain (frontal headache.) and wheezing. Pertinent negatives include no ear pain, headaches, rhinorrhea or sore throat. Sinus Problem  Associated symptoms include congestion, coughing and shortness of breath. Pertinent negatives include no chills, ear pain, headaches, sinus pressure or sore throat. Past treatments include antibiotics (was on Z markus from 11/19.). The treatment provided mild relief. Cough  The current episode started 1 to 4 weeks ago. The cough is Productive of purulent sputum. Associated symptoms include shortness of breath and wheezing. Pertinent negatives include no chills, ear pain, fever, headaches, postnasal drip, rhinorrhea or sore throat. She has tried a beta-agonist inhaler (using albuterol 2 puffs bid.) for the symptoms. The treatment provided moderate relief. Allergies   Allergen Reactions    Cat Hair Extract Itching     Other reaction(s): Other (See Comments)  sneezing    Eggs Or Egg-Derived Products Diarrhea    Horse-Derived Products Other (See Comments) and Swelling     Sneezing    Other Itching and Other (See Comments)     Sneezing  Cats  Other reaction(s):  Other See Comments  sneezing    Pcn [Penicillins]     Seasonal Itching and Other (See Comments)     sneezing    Blueberry [Vaccinium Angustifolium] Hives    Food Hives     Pesto       Current Outpatient Medications on File Prior to Visit   Medication Sig Dispense Refill    brompheniramine-pseudoephedrine-DM 2-30-10 MG/5ML syrup Take 5 mLs by mouth 4 times daily as needed for Congestion or Cough 200 mL 0    albuterol sulfate HFA (PROVENTIL HFA) 108 (90 Base) MCG/ACT inhaler Inhale 2 puffs into the lungs every 6 hours as needed for Wheezing 1 each 1    QUEtiapine (SEROQUEL) 300 MG tablet TAKE 1 TABLET NIGHTLY 90 tablet 0    lamoTRIgine (LAMICTAL) 150 MG tablet TAKE 1 TABLET TWICE A  tablet 0    norethindrone-ethinyl estradiol (JUNEL FE 1/20) 1-20 MG-MCG per tablet Take 1 tablet by mouth daily 84 tablet 1     No current facility-administered medications on file prior to visit. Review of Systems   Constitutional:  Negative for chills and fever. HENT:  Positive for congestion and sinus pain (frontal headache.). Negative for ear pain, postnasal drip, rhinorrhea, sinus pressure and sore throat. Going for weekly allergy shots. Respiratory:  Positive for cough, shortness of breath and wheezing. Neurological:  Negative for headaches. other review of systems reviewed and are negative    OBJECTIVE:  /68   Pulse 82   Temp 98.4 °F (36.9 °C)   Resp 16   Ht 5' 9\" (1.753 m)   Wt 189 lb (85.7 kg)   SpO2 98%   BMI 27.91 kg/m²      Physical Exam  Constitutional:       General: She is not in acute distress. HENT:      Right Ear: Tympanic membrane normal.      Left Ear: Tympanic membrane normal.      Nose: Mucosal edema and rhinorrhea present. Rhinorrhea is clear. Right Turbinates: Swollen. Left Turbinates: Swollen. Right Sinus: No maxillary sinus tenderness or frontal sinus tenderness. Left Sinus: No maxillary sinus tenderness or frontal sinus tenderness. Mouth/Throat:      Pharynx: Uvula midline. No oropharyngeal exudate or posterior oropharyngeal erythema. Cardiovascular:      Rate and Rhythm: Normal rate and regular rhythm. Pulmonary:      Effort: Pulmonary effort is normal.      Breath sounds: Normal breath sounds. Musculoskeletal:      Cervical back: Neck supple. Lymphadenopathy:      Cervical: No cervical adenopathy. ASSESSMENT AND PLAN:    Reid Gutierrez was seen today for congestion, uri and sinus problem.     Diagnoses and all orders for this visit:    Acute recurrent ethmoidal sinusitis  -     doxycycline hyclate (VIBRA-TABS) 100 MG tablet; Take 1 tablet by mouth 2 times daily for 10 days  - continue sudafed. - continue allergy shots. Return if symptoms worsen or fail to improve.     Charlene Nolasco, DO

## 2022-12-27 RX ORDER — ALBUTEROL SULFATE 90 UG/1
2 AEROSOL, METERED RESPIRATORY (INHALATION) EVERY 6 HOURS PRN
Qty: 3 EACH | Refills: 0 | Status: SHIPPED | OUTPATIENT
Start: 2022-12-27

## 2022-12-27 NOTE — TELEPHONE ENCOUNTER
----- Message from Kd Ibrahim sent at 12/27/2022 12:47 PM EST -----  Subject: Refill Request    QUESTIONS  Name of Medication? albuterol sulfate HFA (PROVENTIL HFA) 108 (90 Base)   MCG/ACT inhaler  Patient-reported dosage and instructions? 108 (90 Base) MCG/ACT inhaler  How many days do you have left? 0  [facility]? [id number]  Pharmacy phone number (if available)? [phone]  Additional Information for Provider? Pt. is requesting 2 inhalers to be   called in. She is completely out. Please call when ready. ---------------------------------------------------------------------------  --------------  Natalie CRAWFORD  What is the best way for the office to contact you? OK to leave message on   voicemail  Preferred Call Back Phone Number? [phone]  ---------------------------------------------------------------------------  --------------  SCRIPT ANSWERS  Relationship to Patient?  Self

## 2023-01-06 ENCOUNTER — HOSPITAL ENCOUNTER (EMERGENCY)
Age: 23
Discharge: HOME | End: 2023-01-06
Attending: EMERGENCY MEDICINE
Payer: COMMERCIAL

## 2023-01-06 ENCOUNTER — APPOINTMENT (OUTPATIENT)
Dept: CT IMAGING | Age: 23
End: 2023-01-06
Payer: COMMERCIAL

## 2023-01-06 VITALS
RESPIRATION RATE: 17 BRPM | DIASTOLIC BLOOD PRESSURE: 71 MMHG | HEIGHT: 69 IN | TEMPERATURE: 97.7 F | SYSTOLIC BLOOD PRESSURE: 115 MMHG | HEART RATE: 85 BPM | BODY MASS INDEX: 27.11 KG/M2 | OXYGEN SATURATION: 98 % | WEIGHT: 183 LBS

## 2023-01-06 DIAGNOSIS — R10.31 ABDOMINAL PAIN, RIGHT LOWER QUADRANT: ICD-10-CM

## 2023-01-06 DIAGNOSIS — K62.5 BRBPR (BRIGHT RED BLOOD PER RECTUM): Primary | ICD-10-CM

## 2023-01-06 DIAGNOSIS — Z87.19 HISTORY OF IBS: ICD-10-CM

## 2023-01-06 LAB
ALBUMIN SERPL-MCNC: 4.4 G/DL (ref 3.5–5.2)
ALP BLD-CCNC: 98 U/L (ref 35–104)
ALT SERPL-CCNC: 10 U/L (ref 0–32)
ANION GAP SERPL CALCULATED.3IONS-SCNC: 9 MMOL/L (ref 7–16)
AST SERPL-CCNC: 15 U/L (ref 0–31)
BACTERIA: ABNORMAL /HPF
BASOPHILS ABSOLUTE: 0.06 E9/L (ref 0–0.2)
BASOPHILS RELATIVE PERCENT: 0.9 % (ref 0–2)
BILIRUB SERPL-MCNC: 0.4 MG/DL (ref 0–1.2)
BILIRUBIN DIRECT: <0.2 MG/DL (ref 0–0.3)
BILIRUBIN URINE: NEGATIVE
BILIRUBIN, INDIRECT: NORMAL MG/DL (ref 0–1)
BLOOD, URINE: ABNORMAL
BUN BLDV-MCNC: 9 MG/DL (ref 6–20)
CALCIUM SERPL-MCNC: 9.5 MG/DL (ref 8.6–10.2)
CHLORIDE BLD-SCNC: 100 MMOL/L (ref 98–107)
CHP ED QC CHECK: NORMAL
CLARITY: CLEAR
CO2: 28 MMOL/L (ref 22–29)
COLOR: YELLOW
CREAT SERPL-MCNC: 0.8 MG/DL (ref 0.5–1)
EOSINOPHILS ABSOLUTE: 0.49 E9/L (ref 0.05–0.5)
EOSINOPHILS RELATIVE PERCENT: 7.7 % (ref 0–6)
EPITHELIAL CELLS, UA: ABNORMAL /HPF
GFR SERPL CREATININE-BSD FRML MDRD: >60 ML/MIN/1.73
GLUCOSE BLD-MCNC: 86 MG/DL (ref 74–99)
GLUCOSE URINE: NEGATIVE MG/DL
HCG(URINE) PREGNANCY TEST: NEGATIVE
HCT VFR BLD CALC: 40.1 % (ref 34–48)
HEMOCCULT STL QL: NEGATIVE
HEMOGLOBIN: 13.9 G/DL (ref 11.5–15.5)
IMMATURE GRANULOCYTES #: 0.01 E9/L
IMMATURE GRANULOCYTES %: 0.2 % (ref 0–5)
KETONES, URINE: NEGATIVE MG/DL
LACTIC ACID: 0.7 MMOL/L (ref 0.5–2.2)
LEUKOCYTE ESTERASE, URINE: NEGATIVE
LIPASE: 29 U/L (ref 13–60)
LYMPHOCYTES ABSOLUTE: 2.07 E9/L (ref 1.5–4)
LYMPHOCYTES RELATIVE PERCENT: 32.3 % (ref 20–42)
MCH RBC QN AUTO: 31.4 PG (ref 26–35)
MCHC RBC AUTO-ENTMCNC: 34.7 % (ref 32–34.5)
MCV RBC AUTO: 90.5 FL (ref 80–99.9)
MONOCYTES ABSOLUTE: 0.38 E9/L (ref 0.1–0.95)
MONOCYTES RELATIVE PERCENT: 5.9 % (ref 2–12)
MUCUS: PRESENT /LPF
NEUTROPHILS ABSOLUTE: 3.39 E9/L (ref 1.8–7.3)
NEUTROPHILS RELATIVE PERCENT: 53 % (ref 43–80)
NITRITE, URINE: NEGATIVE
PDW BLD-RTO: 11.6 FL (ref 11.5–15)
PH UA: 6 (ref 5–9)
PLATELET # BLD: 251 E9/L (ref 130–450)
PMV BLD AUTO: 10.2 FL (ref 7–12)
POTASSIUM SERPL-SCNC: 3.5 MMOL/L (ref 3.5–5)
PROTEIN UA: NEGATIVE MG/DL
RBC # BLD: 4.43 E12/L (ref 3.5–5.5)
RBC UA: ABNORMAL /HPF (ref 0–2)
SODIUM BLD-SCNC: 137 MMOL/L (ref 132–146)
SPECIFIC GRAVITY UA: >=1.03 (ref 1–1.03)
TOTAL PROTEIN: 7.8 G/DL (ref 6.4–8.3)
UROBILINOGEN, URINE: 0.2 E.U./DL
WBC # BLD: 6.4 E9/L (ref 4.5–11.5)
WBC UA: ABNORMAL /HPF (ref 0–5)

## 2023-01-06 PROCEDURE — 81025 URINE PREGNANCY TEST: CPT

## 2023-01-06 PROCEDURE — 99285 EMERGENCY DEPT VISIT HI MDM: CPT

## 2023-01-06 PROCEDURE — 83690 ASSAY OF LIPASE: CPT

## 2023-01-06 PROCEDURE — 81001 URINALYSIS AUTO W/SCOPE: CPT

## 2023-01-06 PROCEDURE — 87425 ROTAVIRUS AG IA: CPT

## 2023-01-06 PROCEDURE — 85025 COMPLETE CBC W/AUTO DIFF WBC: CPT

## 2023-01-06 PROCEDURE — 87329 GIARDIA AG IA: CPT

## 2023-01-06 PROCEDURE — 83605 ASSAY OF LACTIC ACID: CPT

## 2023-01-06 PROCEDURE — 82705 FATS/LIPIDS FECES QUAL: CPT

## 2023-01-06 PROCEDURE — 6360000002 HC RX W HCPCS: Performed by: STUDENT IN AN ORGANIZED HEALTH CARE EDUCATION/TRAINING PROGRAM

## 2023-01-06 PROCEDURE — 87324 CLOSTRIDIUM AG IA: CPT

## 2023-01-06 PROCEDURE — 96374 THER/PROPH/DIAG INJ IV PUSH: CPT

## 2023-01-06 PROCEDURE — 80076 HEPATIC FUNCTION PANEL: CPT

## 2023-01-06 PROCEDURE — 2580000003 HC RX 258: Performed by: STUDENT IN AN ORGANIZED HEALTH CARE EDUCATION/TRAINING PROGRAM

## 2023-01-06 PROCEDURE — 87449 NOS EACH ORGANISM AG IA: CPT

## 2023-01-06 PROCEDURE — 87045 FECES CULTURE AEROBIC BACT: CPT

## 2023-01-06 PROCEDURE — 6360000004 HC RX CONTRAST MEDICATION: Performed by: RADIOLOGY

## 2023-01-06 PROCEDURE — 74177 CT ABD & PELVIS W/CONTRAST: CPT

## 2023-01-06 PROCEDURE — 82270 OCCULT BLOOD FECES: CPT

## 2023-01-06 PROCEDURE — 80048 BASIC METABOLIC PNL TOTAL CA: CPT

## 2023-01-06 RX ORDER — HYDROCODONE BITARTRATE AND ACETAMINOPHEN 5; 325 MG/1; MG/1
1 TABLET ORAL EVERY 6 HOURS PRN
Qty: 4 TABLET | Refills: 0 | Status: SHIPPED | OUTPATIENT
Start: 2023-01-06 | End: 2023-01-09

## 2023-01-06 RX ORDER — 0.9 % SODIUM CHLORIDE 0.9 %
1000 INTRAVENOUS SOLUTION INTRAVENOUS ONCE
Status: COMPLETED | OUTPATIENT
Start: 2023-01-06 | End: 2023-01-06

## 2023-01-06 RX ORDER — ONDANSETRON 4 MG/1
4 TABLET, FILM COATED ORAL 3 TIMES DAILY PRN
Qty: 15 TABLET | Refills: 0 | Status: SHIPPED | OUTPATIENT
Start: 2023-01-06

## 2023-01-06 RX ORDER — MORPHINE SULFATE 4 MG/ML
4 INJECTION, SOLUTION INTRAMUSCULAR; INTRAVENOUS ONCE
Status: COMPLETED | OUTPATIENT
Start: 2023-01-06 | End: 2023-01-06

## 2023-01-06 RX ADMIN — MORPHINE SULFATE 4 MG: 4 INJECTION, SOLUTION INTRAMUSCULAR; INTRAVENOUS at 15:36

## 2023-01-06 RX ADMIN — IOPAMIDOL 75 ML: 755 INJECTION, SOLUTION INTRAVENOUS at 17:07

## 2023-01-06 RX ADMIN — SODIUM CHLORIDE 1000 ML: 9 INJECTION, SOLUTION INTRAVENOUS at 15:36

## 2023-01-06 ASSESSMENT — PAIN - FUNCTIONAL ASSESSMENT
PAIN_FUNCTIONAL_ASSESSMENT: 0-10
PAIN_FUNCTIONAL_ASSESSMENT: NONE - DENIES PAIN

## 2023-01-06 ASSESSMENT — PAIN DESCRIPTION - LOCATION
LOCATION: ABDOMEN
LOCATION: RECTUM

## 2023-01-06 ASSESSMENT — PAIN SCALES - GENERAL
PAINLEVEL_OUTOF10: 7
PAINLEVEL_OUTOF10: 6

## 2023-01-06 NOTE — DISCHARGE INSTRUCTIONS
Follow-up with gastroenterology for continued symptoms  Follow-up with primary care as needed    Return to the emergency department for new or worsening symptoms

## 2023-01-07 LAB
C DIFF TOXIN/ANTIGEN: NORMAL
ROTAVIRUS ANTIGEN: NORMAL

## 2023-01-07 ASSESSMENT — ENCOUNTER SYMPTOMS
ABDOMINAL DISTENTION: 0
BLOOD IN STOOL: 1
TROUBLE SWALLOWING: 0
RHINORRHEA: 0
VOMITING: 0
DIARRHEA: 0
SHORTNESS OF BREATH: 0
COUGH: 0
ABDOMINAL PAIN: 1
NAUSEA: 1
CONSTIPATION: 0

## 2023-01-07 NOTE — ED PROVIDER NOTES
[address]        Pt Name: Patricia Adames  MRN: [id number]  [place] 2000  Date of evaluation: 1/6/2023  Provider: Bartolome Blum MD  PCP: Chaparro Manzano DO  Note Started: 11:59 PM EST 1/6/23    CHIEF COMPLAINT       Chief Complaint   Patient presents with    Rectal Bleeding     X 5 times Since yesterday, bright red and dark    Abdominal Pain    Nausea       HISTORY OF PRESENT ILLNESS: 1 or more Elements   History From: Patient    Limitations to history : None    Patricia Adames is a 20 y.o. female past medical history of IBS who presents to the emergency department with complaint of bright red blood per rectum. Nursing Notes were all reviewed and agreed with or any disagreements were addressed in the HPI. REVIEW OF SYSTEMS :      Review of Systems   Constitutional:  Negative for chills and fever. HENT:  Negative for congestion, rhinorrhea and trouble swallowing. Respiratory:  Negative for cough and shortness of breath. Cardiovascular:  Negative for chest pain, palpitations and leg swelling. Gastrointestinal:  Positive for abdominal pain, blood in stool and nausea. Negative for abdominal distention, constipation, diarrhea and vomiting. Genitourinary:  Negative for dysuria and hematuria. Musculoskeletal:  Negative for arthralgias and myalgias. Neurological:  Negative for dizziness and light-headedness. Positives and Pertinent negatives as per HPI.      SURGICAL HISTORY     Past Surgical History:   Procedure Laterality Date    CHOLECYSTECTOMY, LAPAROSCOPIC N/A 12/4/2018    CHOLECYSTECTOMY LAPAROSCOPIC POSSIBLE OPEN POSSIBLE GRAM performed by Christina Gates MD at [facility]      x2    FINGER REPLANTATION      LAPAROSCOPIC APPENDECTOMY N/A 12/4/2018    APPENDECTOMY LAPAROSCOPIC POSSIBLE OPEN performed by Christina Gates MD at [facility]       Discharge Medication List as of 1/6/2023  5:51 PM        CONTINUE these medications which have NOT CHANGED    Details   albuterol sulfate HFA (PROVENTIL HFA) 108 (90 Base) MCG/ACT inhaler Inhale 2 puffs into the lungs every 6 hours as needed for Wheezing, Disp-3 each, R-0Normal      brompheniramine-pseudoephedrine-DM 2-30-10 MG/5ML syrup Take 5 mLs by mouth 4 times daily as needed for Congestion or Cough, Disp-200 mL, R-0Normal      QUEtiapine (SEROQUEL) 300 MG tablet TAKE 1 TABLET NIGHTLY, Disp-90 tablet, R-0Normal      lamoTRIgine (LAMICTAL) 150 MG tablet TAKE 1 TABLET TWICE A DAY, Disp-180 tablet, R-0Normal      norethindrone-ethinyl estradiol (JUNEL FE 1/20) 1-20 MG-MCG per tablet Take 1 tablet by mouth daily, Disp-84 tablet, R-1Normal             ALLERGIES     Cat hair extract, Eggs or egg-derived products, Horse-derived products, Other, Pcn [penicillins], Seasonal, Blueberry [vaccinium angustifolium], and Food    FAMILYHISTORY       Family History   Problem Relation Age of Onset    Depression Mother     Arthritis Mother         RA        SOCIAL HISTORY       Social History     Tobacco Use    Smoking status: Never     Passive exposure: Yes    Smokeless tobacco: Never   Substance Use Topics    Alcohol use: No    Drug use: No       SCREENINGS        Alvaro Coma Scale  Eye Opening: Spontaneous  Best Verbal Response: Oriented  Best Motor Response: Obeys commands  Alvaro Coma Scale Score: 15                CIWA Assessment  BP: 115/71  Heart Rate: 85           PHYSICAL EXAM  1 or more Elements     ED Triage Vitals [01/06/23 1202]   BP Temp Temp Source Heart Rate Resp SpO2 Height Weight   118/78 97.7 °F (36.5 °C) Oral 99 16 100 % 5' 9\" (1.753 m) 183 lb (83 kg)     Physical Exam  Vitals and nursing note reviewed. Exam conducted with a chaperone present. Constitutional:       General: She is awake. She is not in acute distress. Appearance: She is normal weight. She is not ill-appearing.    HENT:      Head: Normocephalic and atraumatic. Mouth/Throat:      Mouth: Mucous membranes are moist.   Cardiovascular:      Rate and Rhythm: Normal rate and regular rhythm. Pulses: Normal pulses. Radial pulses are 2+ on the right side and 2+ on the left side. Heart sounds: Normal heart sounds. Pulmonary:      Effort: Pulmonary effort is normal.      Breath sounds: Normal breath sounds. Abdominal:      General: Abdomen is flat. There is no distension. Palpations: Abdomen is soft. Tenderness: There is abdominal tenderness in the right lower quadrant. There is no guarding. Genitourinary:     Rectum: Guaiac result negative. No tenderness or external hemorrhoid. Comments: Soft brown stool noted on fingertip, no gross bleeding  Musculoskeletal:         General: Normal range of motion. Skin:     General: Skin is warm and dry. Capillary Refill: Capillary refill takes less than 2 seconds. Neurological:      General: No focal deficit present. Mental Status: She is alert and oriented to person, place, and time. Psychiatric:         Behavior: Behavior is cooperative.      DIAGNOSTIC RESULTS   LABS:    Labs Reviewed   CBC WITH AUTO DIFFERENTIAL - Abnormal; Notable for the following components:       Result Value    MCHC 34.7 (*)     Eosinophils % 7.7 (*)     All other components within normal limits   URINALYSIS WITH MICROSCOPIC - Abnormal; Notable for the following components:    Mucus, UA Present (*)     Bacteria, UA RARE (*)     All other components within normal limits   POCT OCCULT BLOOD STOOL COLON CA SCREEN 1-3 - Normal   CULTURE, STOOL   CLOSTRIDIUM DIFFICILE EIA   GIARDIA ANTIGEN   BASIC METABOLIC PANEL   HEPATIC FUNCTION PANEL   LACTIC ACID   LIPASE   PREGNANCY, URINE   ROTAVIRUS ANTIGEN, STOOL   FECAL FAT, QUALITATIVE    Narrative:     Fat, Fecal Qualitative   BLOOD OCCULT STOOL SCREEN #1     As interpreted by me, the above displayed labs are abnormal. All other labs obtained during this visit were within normal range or not returned as of this dictation. RADIOLOGY:   Non-plain film images such as CT, Ultrasound and MRI are read by the radiologist. Plain radiographic images are visualized and preliminarily interpreted by the ED Provider with the below findings:    Interpretation per the Radiologist below, if available at the time of this note:    CT ABDOMEN PELVIS W IV CONTRAST Additional Contrast? None   Final Result   No acute process in the abdomen and pelvis. Status post cholecystectomy and appendectomy. CT ABDOMEN PELVIS W IV CONTRAST Additional Contrast? None    Result Date: 1/6/2023  EXAMINATION: CT OF THE ABDOMEN AND PELVIS WITH CONTRAST 1/6/2023 5:07 pm TECHNIQUE: CT of the abdomen and pelvis was performed with the administration of intravenous contrast. Multiplanar reformatted images are provided for review. Automated exposure control, iterative reconstruction, and/or weight based adjustment of the mA/kV was utilized to reduce the radiation dose to as low as reasonably achievable. COMPARISON: October 2, 2020 HISTORY: ORDERING SYSTEM PROVIDED HISTORY: right side abd pain, blood stool TECHNOLOGIST PROVIDED HISTORY: Additional Contrast?->None Reason for exam:->right side abd pain, blood stool Decision Support Exception - unselect if not a suspected or confirmed emergency medical condition->Emergency Medical Condition (MA) FINDINGS: Lower Chest: No infiltrates or pleural effusion. Organs: No focal liver lesions. Gallbladder is surgically absent. Spleen is not enlarged. Pancreas and adrenal glands appear unremarkable. There is symmetric enhancement of the kidneys with no hydronephrosis or perinephric infiltration. GI/Bowel: No obstructing or constricting lesions. The appendix is surgically absent. Pelvis: Bladder appears unremarkable. The uterus is present. There is no significant free fluid. Peritoneum/Retroperitoneum: No free air or significant adenopathy.  Bones/Soft Tissues: Unremarkable. No acute process in the abdomen and pelvis. Status post cholecystectomy and appendectomy. No results found. PROCEDURES   Unless otherwise noted below, none     CRITICAL CARE TIME (.cct)       PAST MEDICAL HISTORY/Chronic Conditions Affecting Care      has a past medical history of Abdominal pain, Anxiety, Asthma, Back pain, Depression, Headache, Insomnia, and Irritable bowel syndrome. EMERGENCY DEPARTMENT COURSE    Vitals:    Vitals:    01/06/23 1530 01/06/23 1545 01/06/23 1548 01/06/23 1752   BP:   115/71    Pulse: 83 89 89 85   Resp: 15  16 17   Temp:       TempSrc:       SpO2: 99%  98%    Weight:       Height:           Patient was given the following medications:  Medications   0.9 % sodium chloride bolus (0 mLs IntraVENous Stopped 1/6/23 1824)   morphine sulfate (PF) injection 4 mg (4 mg IntraVENous Given 1/6/23 1536)   iopamidol (ISOVUE-370) 76 % injection 75 mL (75 mLs IntraVENous Given 1/6/23 1707)       Medical Decision Making/Differential Diagnosis:    CC/HPI Summary, Social Determinants of health, Records Reviewed, DDx, testing done/not done, ED Course, Reassessment, disposition considerations/shared decision making with patient, consults, disposition:      Patient is a 23-year-old female with a history of IBS who presents to the emergency department with complaint of bright red blood per rectum as well as nausea and abdominal pain. Patient states this has been going on for 2 days, denies any new foods or drinks that could be causing gastroenteritis. Patient states she has had multiple episodes of bright red blood per rectum, they are becoming less frequent. Patient complains of right lower quadrant pain, patient is postcholecystectomy and appendectomy. Patient presents awake, alert, following all commands, no apparent distress. Vital signs are noted. Physical exam shows mild right lower quadrant tenderness.  exam shows no occult blood.   Patient was given morphine IV for analgesia. Work-up including labs reassuring, no leukocytosis or anemia, urine pregnancy is negative, urinalysis not consistent with urinary tract infection. Patient is feeling improved after IV fluids and analgesia. Patient did have bowel movement in the emergency department, stool samples will be followed. CT scan shows no acute pathology, no signs of diverticulitis or diverticulosis. Diagnoses considered include IBS, diverticulitis, infectious source. Work-up was discussed with patient, she is improved. Patient is stable, with no acute signs requiring admission. Patient will follow up with her gastroenterologist, and is stable for discharge at this time. Patient plan was discussed with her and her mother at bedside, they are agreeable. Patient will be able discharged with antiemetic as well as analgesic to be used for breakthrough pain. All questions were answered. Patient will follow-up outpatient. Return instructions were given. CONSULTS: (Who and What was discussed)  None    FINAL IMPRESSION      1. BRBPR (bright red blood per rectum)    2. Abdominal pain, right lower quadrant    3. History of IBS        DISPOSITION/PLAN     DISPOSITION Decision To Discharge 01/06/2023 05:47:42 PM    PATIENT REFERRED TO:  Jamie Smith MD  [address] [id number]    Call   For ED follow up    Laura Frederick DO  [phone] [phone]    Call   As needed    DISCHARGE MEDICATIONS:  Discharge Medication List as of 1/6/2023  5:51 PM        START taking these medications    Details   ondansetron (ZOFRAN) 4 MG tablet Take 1 tablet by mouth 3 times daily as needed for Nausea or Vomiting, Disp-15 tablet, R-0Normal      HYDROcodone-acetaminophen (NORCO) 5-325 MG per tablet Take 1 tablet by mouth every 6 hours as needed for Pain for up to 3 days. Intended supply: 3 days.  Take lowest dose possible to manage pain Max Daily Amount: 4 tablets, Disp-4 tablet, R-0Normal           DISCONTINUED MEDICATIONS:  Discharge Medication List as of 1/6/2023  5:51 PM        (Please note that portions of this note were completed with a voice recognition program.  Efforts were made to edit the dictations but occasionally words are mis-transcribed.)    Maria Isabel Mcguire MD PGY 3       Maria Isabel Mcguire MD  Resident  01/07/23 8815

## 2023-01-08 DIAGNOSIS — F33.40 RECURRENT MAJOR DEPRESSIVE DISORDER, IN REMISSION (HCC): ICD-10-CM

## 2023-01-08 DIAGNOSIS — F41.1 GAD (GENERALIZED ANXIETY DISORDER): ICD-10-CM

## 2023-01-08 LAB
CULTURE, STOOL: NORMAL
OCCULT BLOOD SCREENING: NORMAL

## 2023-01-09 LAB
CULTURE, STOOL: NORMAL
FECAL NEUTRAL FAT: NORMAL
FECAL SPLIT FATS: NORMAL
GIARDIA ANTIGEN STOOL: NORMAL

## 2023-01-09 RX ORDER — LAMOTRIGINE 150 MG/1
TABLET ORAL
Qty: 180 TABLET | Refills: 1 | Status: SHIPPED | OUTPATIENT
Start: 2023-01-09

## 2023-01-09 RX ORDER — QUETIAPINE FUMARATE 300 MG/1
TABLET, FILM COATED ORAL
Qty: 90 TABLET | Refills: 0 | Status: SHIPPED | OUTPATIENT
Start: 2023-01-09

## 2023-01-09 RX ORDER — NORETHINDRONE ACETATE AND ETHINYL ESTRADIOL AND FERROUS FUMARATE 1MG-20(21)
KIT ORAL
Qty: 84 TABLET | Refills: 1 | Status: SHIPPED | OUTPATIENT
Start: 2023-01-09

## 2023-04-01 DIAGNOSIS — F41.1 GAD (GENERALIZED ANXIETY DISORDER): ICD-10-CM

## 2023-04-01 DIAGNOSIS — F33.40 RECURRENT MAJOR DEPRESSIVE DISORDER, IN REMISSION (HCC): ICD-10-CM

## 2023-04-04 ENCOUNTER — OFFICE VISIT (OUTPATIENT)
Dept: PRIMARY CARE CLINIC | Age: 23
End: 2023-04-04
Payer: COMMERCIAL

## 2023-04-04 VITALS
BODY MASS INDEX: 27.17 KG/M2 | OXYGEN SATURATION: 98 % | DIASTOLIC BLOOD PRESSURE: 80 MMHG | WEIGHT: 184 LBS | HEART RATE: 78 BPM | SYSTOLIC BLOOD PRESSURE: 110 MMHG | TEMPERATURE: 98.7 F

## 2023-04-04 DIAGNOSIS — J01.20 ACUTE NON-RECURRENT ETHMOIDAL SINUSITIS: Primary | ICD-10-CM

## 2023-04-04 DIAGNOSIS — F33.40 RECURRENT MAJOR DEPRESSIVE DISORDER, IN REMISSION (HCC): ICD-10-CM

## 2023-04-04 PROCEDURE — G8427 DOCREV CUR MEDS BY ELIG CLIN: HCPCS | Performed by: FAMILY MEDICINE

## 2023-04-04 PROCEDURE — G8419 CALC BMI OUT NRM PARAM NOF/U: HCPCS | Performed by: FAMILY MEDICINE

## 2023-04-04 PROCEDURE — 99213 OFFICE O/P EST LOW 20 MIN: CPT | Performed by: FAMILY MEDICINE

## 2023-04-04 PROCEDURE — 1036F TOBACCO NON-USER: CPT | Performed by: FAMILY MEDICINE

## 2023-04-04 RX ORDER — QUETIAPINE FUMARATE 300 MG/1
TABLET, FILM COATED ORAL
Qty: 90 TABLET | Refills: 0 | OUTPATIENT
Start: 2023-04-04

## 2023-04-04 RX ORDER — ALBUTEROL SULFATE 90 UG/1
2 AEROSOL, METERED RESPIRATORY (INHALATION) EVERY 6 HOURS PRN
Qty: 3 EACH | Refills: 0 | Status: SHIPPED | OUTPATIENT
Start: 2023-04-04

## 2023-04-04 RX ORDER — DOXYCYCLINE HYCLATE 100 MG
100 TABLET ORAL 2 TIMES DAILY
Qty: 14 TABLET | Refills: 0 | Status: SHIPPED | OUTPATIENT
Start: 2023-04-04 | End: 2023-04-11

## 2023-04-04 SDOH — ECONOMIC STABILITY: HOUSING INSECURITY
IN THE LAST 12 MONTHS, WAS THERE A TIME WHEN YOU DID NOT HAVE A STEADY PLACE TO SLEEP OR SLEPT IN A SHELTER (INCLUDING NOW)?: NO

## 2023-04-04 SDOH — ECONOMIC STABILITY: FOOD INSECURITY: WITHIN THE PAST 12 MONTHS, YOU WORRIED THAT YOUR FOOD WOULD RUN OUT BEFORE YOU GOT MONEY TO BUY MORE.: NEVER TRUE

## 2023-04-04 SDOH — ECONOMIC STABILITY: INCOME INSECURITY: HOW HARD IS IT FOR YOU TO PAY FOR THE VERY BASICS LIKE FOOD, HOUSING, MEDICAL CARE, AND HEATING?: NOT HARD AT ALL

## 2023-04-04 SDOH — ECONOMIC STABILITY: FOOD INSECURITY: WITHIN THE PAST 12 MONTHS, THE FOOD YOU BOUGHT JUST DIDN'T LAST AND YOU DIDN'T HAVE MONEY TO GET MORE.: NEVER TRUE

## 2023-04-04 ASSESSMENT — PATIENT HEALTH QUESTIONNAIRE - PHQ9
SUM OF ALL RESPONSES TO PHQ QUESTIONS 1-9: 0
10. IF YOU CHECKED OFF ANY PROBLEMS, HOW DIFFICULT HAVE THESE PROBLEMS MADE IT FOR YOU TO DO YOUR WORK, TAKE CARE OF THINGS AT HOME, OR GET ALONG WITH OTHER PEOPLE: 0
1. LITTLE INTEREST OR PLEASURE IN DOING THINGS: 0
SUM OF ALL RESPONSES TO PHQ QUESTIONS 1-9: 0
6. FEELING BAD ABOUT YOURSELF - OR THAT YOU ARE A FAILURE OR HAVE LET YOURSELF OR YOUR FAMILY DOWN: 0
7. TROUBLE CONCENTRATING ON THINGS, SUCH AS READING THE NEWSPAPER OR WATCHING TELEVISION: 0
5. POOR APPETITE OR OVEREATING: 0
SUM OF ALL RESPONSES TO PHQ9 QUESTIONS 1 & 2: 0
4. FEELING TIRED OR HAVING LITTLE ENERGY: 0
9. THOUGHTS THAT YOU WOULD BE BETTER OFF DEAD, OR OF HURTING YOURSELF: 0
8. MOVING OR SPEAKING SO SLOWLY THAT OTHER PEOPLE COULD HAVE NOTICED. OR THE OPPOSITE, BEING SO FIGETY OR RESTLESS THAT YOU HAVE BEEN MOVING AROUND A LOT MORE THAN USUAL: 0
2. FEELING DOWN, DEPRESSED OR HOPELESS: 0
SUM OF ALL RESPONSES TO PHQ QUESTIONS 1-9: 0
3. TROUBLE FALLING OR STAYING ASLEEP: 0
SUM OF ALL RESPONSES TO PHQ QUESTIONS 1-9: 0

## 2023-04-04 ASSESSMENT — ENCOUNTER SYMPTOMS
SINUS PRESSURE: 0
COUGH: 1
SINUS PAIN: 0
RHINORRHEA: 1
SHORTNESS OF BREATH: 0
WHEEZING: 1
SORE THROAT: 1
VOMITING: 1

## 2023-04-04 NOTE — PROGRESS NOTES
(SEROQUEL) 300 MG tablet TAKE 1 TABLET NIGHTLY 90 tablet 0    lamoTRIgine (LAMICTAL) 150 MG tablet TAKE 1 TABLET TWICE A  tablet 1    ondansetron (ZOFRAN) 4 MG tablet Take 1 tablet by mouth 3 times daily as needed for Nausea or Vomiting 15 tablet 0    brompheniramine-pseudoephedrine-DM 2-30-10 MG/5ML syrup Take 5 mLs by mouth 4 times daily as needed for Congestion or Cough 200 mL 0     No current facility-administered medications on file prior to visit. Review of Systems   Constitutional:  Negative for activity change, appetite change, chills, fatigue and fever. HENT:  Positive for postnasal drip, rhinorrhea and sore throat. Negative for congestion, ear pain, sinus pressure and sinus pain. Respiratory:  Positive for cough and wheezing (this am relieved with Albuterol mdi. ). Negative for shortness of breath. Gastrointestinal:  Positive for vomiting (due to mucus in throat. ). Neurological:  Positive for headaches. Psychiatric/Behavioral:  Positive for depression. Negative for agitation, decreased concentration, dysphoric mood and sleep disturbance. The patient is not nervous/anxious. other review of systems reviewed and are negative    OBJECTIVE:  /80   Pulse 78   Temp 98.7 °F (37.1 °C)   Wt 184 lb (83.5 kg)   SpO2 98%   BMI 27.17 kg/m²      Physical Exam  Constitutional:       General: She is not in acute distress. HENT:      Right Ear: Tympanic membrane normal.      Left Ear: Tympanic membrane normal.      Nose: Rhinorrhea present. No mucosal edema. Rhinorrhea is clear. Right Turbinates: Swollen. Left Turbinates: Swollen. Right Sinus: No maxillary sinus tenderness or frontal sinus tenderness. Left Sinus: No maxillary sinus tenderness or frontal sinus tenderness. Mouth/Throat:      Pharynx: Uvula midline. No oropharyngeal exudate or posterior oropharyngeal erythema. Cardiovascular:      Rate and Rhythm: Normal rate and regular rhythm.    Pulmonary:

## 2023-05-19 ENCOUNTER — OFFICE VISIT (OUTPATIENT)
Dept: PRIMARY CARE CLINIC | Age: 23
End: 2023-05-19
Payer: COMMERCIAL

## 2023-05-19 VITALS
HEART RATE: 78 BPM | TEMPERATURE: 97.2 F | OXYGEN SATURATION: 99 % | WEIGHT: 183 LBS | SYSTOLIC BLOOD PRESSURE: 110 MMHG | DIASTOLIC BLOOD PRESSURE: 80 MMHG | BODY MASS INDEX: 27.02 KG/M2

## 2023-05-19 DIAGNOSIS — Z00.00 ANNUAL PHYSICAL EXAM: Primary | ICD-10-CM

## 2023-05-19 PROCEDURE — 99395 PREV VISIT EST AGE 18-39: CPT | Performed by: FAMILY MEDICINE

## 2023-05-19 NOTE — PROGRESS NOTES
SUBJECTIVE:    HPI: Torin Sofia  Was seen here today for   Chief Complaint   Patient presents with    Check-Up     Exam for work    . She states they are dealing with no new issues. Past Medical History:   Diagnosis Date    Abdominal pain     Anxiety     Asthma     Back pain     Depression     Headache     posterior, radiates to neck, all testing negative. Insomnia     Irritable bowel syndrome        Past Surgical History:   Procedure Laterality Date    CHOLECYSTECTOMY, LAPAROSCOPIC N/A 12/4/2018    CHOLECYSTECTOMY LAPAROSCOPIC POSSIBLE OPEN POSSIBLE GRAM performed by Álvaro Fine MD at [address] [place] [address]      x2    FINGER REPLANTATION      LAPAROSCOPIC APPENDECTOMY N/A 12/4/2018    APPENDECTOMY LAPAROSCOPIC POSSIBLE OPEN performed by Álvaro Fine MD at [facility]       Family History   Problem Relation Age of Onset    Depression Mother     Arthritis Mother         RA       Prior to Admission medications    Medication Sig Start Date End Date Taking?  Authorizing Provider   albuterol sulfate HFA (PROVENTIL HFA) 108 (90 Base) MCG/ACT inhaler Inhale 2 puffs into the lungs every 6 hours as needed for Wheezing 4/4/23  Yes Jb Nolasco, DO   QUEtiapine (SEROQUEL) 300 MG tablet TAKE 1 TABLET NIGHTLY 1/9/23  Yes Jb Nolasco DO   lamoTRIgine (LAMICTAL) 150 MG tablet TAKE 1 TABLET TWICE A DAY 1/9/23  Yes Jb Nolasco, DO   ondansetron (ZOFRAN) 4 MG tablet Take 1 tablet by mouth 3 times daily as needed for Nausea or Vomiting 1/6/23  Yes Petros Hameed MD   brompheniramine-pseudoephedrine-DM 2-30-10 MG/5ML syrup Take 5 mLs by mouth 4 times daily as needed for Congestion or Cough  Patient not taking: Reported on 5/19/2023 11/29/22   [facility] Uriel Nolasco, DO       Cat hair extract, Eggs or egg-derived products, Horse-derived products, Other, Pcn [penicillins], Seasonal, Blueberry [vaccinium angustifolium], and Food    Social History     Tobacco Use    Smoking status: Never

## 2023-07-07 DIAGNOSIS — F41.1 GAD (GENERALIZED ANXIETY DISORDER): ICD-10-CM

## 2023-07-07 DIAGNOSIS — F33.40 RECURRENT MAJOR DEPRESSIVE DISORDER, IN REMISSION (HCC): ICD-10-CM

## 2023-07-07 RX ORDER — LAMOTRIGINE 150 MG/1
TABLET ORAL
Qty: 180 TABLET | Refills: 0 | Status: SHIPPED | OUTPATIENT
Start: 2023-07-07

## 2023-08-09 LAB
B-HCG SERPL EIA 3RD IS-ACNC: <0.1 MIU/ML (ref 0–7)
CHOLEST SERPL-MCNC: 173 MG/DL
GGT SERPL-CCNC: 19 U/L (ref 6–42)
HDLC SERPL-MCNC: 63 MG/DL
LDLC SERPL CALC-MCNC: 96 MG/DL
TRIGL SERPL-MCNC: 71 MG/DL
VLDLC SERPL CALC-MCNC: 14 MG/DL

## 2023-09-20 DIAGNOSIS — F41.1 GAD (GENERALIZED ANXIETY DISORDER): ICD-10-CM

## 2023-09-20 DIAGNOSIS — F33.40 RECURRENT MAJOR DEPRESSIVE DISORDER, IN REMISSION (HCC): ICD-10-CM

## 2023-09-20 RX ORDER — LAMOTRIGINE 150 MG/1
150 TABLET ORAL 2 TIMES DAILY
Qty: 180 TABLET | Refills: 0 | Status: SHIPPED | OUTPATIENT
Start: 2023-09-20

## 2023-11-08 LAB
CHOLEST SERPL-MCNC: 193 MG/DL
GGT SERPL-CCNC: 26 U/L (ref 6–42)
HDLC SERPL-MCNC: 57 MG/DL
LDLC SERPL CALC-MCNC: 117 MG/DL
TRIGL SERPL-MCNC: 96 MG/DL
VLDLC SERPL CALC-MCNC: 19 MG/DL

## 2023-12-01 DIAGNOSIS — F33.40 RECURRENT MAJOR DEPRESSIVE DISORDER, IN REMISSION (HCC): ICD-10-CM

## 2023-12-01 DIAGNOSIS — F41.1 GAD (GENERALIZED ANXIETY DISORDER): ICD-10-CM

## 2023-12-01 RX ORDER — QUETIAPINE FUMARATE 300 MG/1
TABLET, FILM COATED ORAL
Qty: 90 TABLET | Refills: 0 | Status: SHIPPED | OUTPATIENT
Start: 2023-12-01

## 2024-01-03 DIAGNOSIS — F33.40 RECURRENT MAJOR DEPRESSIVE DISORDER, IN REMISSION (HCC): ICD-10-CM

## 2024-01-03 DIAGNOSIS — F41.1 GAD (GENERALIZED ANXIETY DISORDER): ICD-10-CM

## 2024-01-03 RX ORDER — LAMOTRIGINE 150 MG/1
150 TABLET ORAL 2 TIMES DAILY
Qty: 180 TABLET | Refills: 0 | OUTPATIENT
Start: 2024-01-03

## 2024-01-08 DIAGNOSIS — J01.20 ACUTE NON-RECURRENT ETHMOIDAL SINUSITIS: ICD-10-CM

## 2024-01-08 RX ORDER — ALBUTEROL SULFATE 90 UG/1
2 AEROSOL, METERED RESPIRATORY (INHALATION) EVERY 6 HOURS PRN
Qty: 3 EACH | Refills: 0 | Status: SHIPPED | OUTPATIENT
Start: 2024-01-08

## 2024-01-09 ENCOUNTER — OFFICE VISIT (OUTPATIENT)
Dept: PRIMARY CARE CLINIC | Age: 24
End: 2024-01-09
Payer: COMMERCIAL

## 2024-01-09 VITALS
SYSTOLIC BLOOD PRESSURE: 102 MMHG | RESPIRATION RATE: 16 BRPM | HEIGHT: 69 IN | BODY MASS INDEX: 28.88 KG/M2 | DIASTOLIC BLOOD PRESSURE: 72 MMHG | TEMPERATURE: 97.8 F | OXYGEN SATURATION: 99 % | HEART RATE: 105 BPM | WEIGHT: 195 LBS

## 2024-01-09 DIAGNOSIS — F33.40 RECURRENT MAJOR DEPRESSIVE DISORDER, IN REMISSION (HCC): ICD-10-CM

## 2024-01-09 DIAGNOSIS — J01.21 RECURRENT ETHMOIDAL SINUSITIS: Primary | ICD-10-CM

## 2024-01-09 PROCEDURE — G8419 CALC BMI OUT NRM PARAM NOF/U: HCPCS | Performed by: FAMILY MEDICINE

## 2024-01-09 PROCEDURE — 1036F TOBACCO NON-USER: CPT | Performed by: FAMILY MEDICINE

## 2024-01-09 PROCEDURE — G8484 FLU IMMUNIZE NO ADMIN: HCPCS | Performed by: FAMILY MEDICINE

## 2024-01-09 PROCEDURE — 99213 OFFICE O/P EST LOW 20 MIN: CPT | Performed by: FAMILY MEDICINE

## 2024-01-09 PROCEDURE — G8427 DOCREV CUR MEDS BY ELIG CLIN: HCPCS | Performed by: FAMILY MEDICINE

## 2024-01-09 RX ORDER — CEFDINIR 300 MG/1
300 CAPSULE ORAL 2 TIMES DAILY
Qty: 20 CAPSULE | Refills: 0 | Status: SHIPPED | OUTPATIENT
Start: 2024-01-09 | End: 2024-01-19

## 2024-01-09 RX ORDER — FLUTICASONE PROPIONATE 50 MCG
1 SPRAY, SUSPENSION (ML) NASAL DAILY PRN
COMMUNITY
Start: 2023-11-19

## 2024-01-09 ASSESSMENT — PATIENT HEALTH QUESTIONNAIRE - PHQ9
SUM OF ALL RESPONSES TO PHQ9 QUESTIONS 1 & 2: 0
SUM OF ALL RESPONSES TO PHQ QUESTIONS 1-9: 0
6. FEELING BAD ABOUT YOURSELF - OR THAT YOU ARE A FAILURE OR HAVE LET YOURSELF OR YOUR FAMILY DOWN: 0
SUM OF ALL RESPONSES TO PHQ QUESTIONS 1-9: 0
7. TROUBLE CONCENTRATING ON THINGS, SUCH AS READING THE NEWSPAPER OR WATCHING TELEVISION: 0
5. POOR APPETITE OR OVEREATING: 0
3. TROUBLE FALLING OR STAYING ASLEEP: 0
4. FEELING TIRED OR HAVING LITTLE ENERGY: 0
2. FEELING DOWN, DEPRESSED OR HOPELESS: 0
SUM OF ALL RESPONSES TO PHQ QUESTIONS 1-9: 0
9. THOUGHTS THAT YOU WOULD BE BETTER OFF DEAD, OR OF HURTING YOURSELF: 0
SUM OF ALL RESPONSES TO PHQ QUESTIONS 1-9: 0
8. MOVING OR SPEAKING SO SLOWLY THAT OTHER PEOPLE COULD HAVE NOTICED. OR THE OPPOSITE, BEING SO FIGETY OR RESTLESS THAT YOU HAVE BEEN MOVING AROUND A LOT MORE THAN USUAL: 0
10. IF YOU CHECKED OFF ANY PROBLEMS, HOW DIFFICULT HAVE THESE PROBLEMS MADE IT FOR YOU TO DO YOUR WORK, TAKE CARE OF THINGS AT HOME, OR GET ALONG WITH OTHER PEOPLE: 0
1. LITTLE INTEREST OR PLEASURE IN DOING THINGS: 0

## 2024-01-09 ASSESSMENT — ENCOUNTER SYMPTOMS
SINUS PAIN: 0
COUGH: 1
RHINORRHEA: 1
SHORTNESS OF BREATH: 1
WHEEZING: 1
SORE THROAT: 0
SINUS PRESSURE: 0

## 2024-01-09 NOTE — PROGRESS NOTES
Monik Gifford, a female of 23 y.o. came to the office 1/9/2024.     Patient Active Problem List   Diagnosis    Recurrent major depressive disorder, in remission (HCC)          Cough  This is a new problem. The current episode started more than 1 month ago (began with sinus infection. put on z markus.). The cough is Productive of purulent sputum (clear and foamy recently). Associated symptoms include nasal congestion, postnasal drip, rhinorrhea, shortness of breath and wheezing. Pertinent negatives include no chills, ear pain, fever, headaches, sore throat or weight loss. She has tried a beta-agonist inhaler for the symptoms.   Depression  Visit Type: follow-up  Patient presents with the following symptoms: hypersomnia and shortness of breath.  Patient is not experiencing: compulsions, depressed mood, excessive worry, feelings of hopelessness, feelings of worthlessness, insomnia, irritability, nervousness/anxiety, obsessions, psychomotor agitation, suicidal ideas and weight loss.       - lives her partner for 2 yrs. 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 Got all A's last semester at [employer].    Allergies   Allergen Reactions    Cat Hair Extract Itching     Other reaction(s): Other (See Comments)  sneezing    Eggs Or Egg-Derived Products Diarrhea    Horse-Derived Products Other (See Comments) and Swelling     Sneezing    Other Itching and Other (See Comments)     Sneezing  Cats  Other reaction(s): Other See Comments  sneezing    Pcn [Penicillins]     Seasonal Itching and Other (See Comments)     sneezing    Blueberry [Vaccinium Angustifolium] Hives    Food Hives     Pesto       Current Outpatient Medications on File Prior to Visit   Medication Sig Dispense Refill    fluticasone (FLONASE) 50 MCG/ACT nasal spray 1 spray by Each Nostril route daily as needed for Rhinitis      albuterol sulfate HFA (PROVENTIL HFA) 108 (90 Base) MCG/ACT inhaler Inhale 2 puffs into the lungs every 6 hours as needed for Wheezing 3 each 0

## 2024-02-16 DIAGNOSIS — F41.1 GAD (GENERALIZED ANXIETY DISORDER): ICD-10-CM

## 2024-02-16 DIAGNOSIS — F33.40 RECURRENT MAJOR DEPRESSIVE DISORDER, IN REMISSION (HCC): ICD-10-CM

## 2024-02-16 RX ORDER — QUETIAPINE FUMARATE 300 MG/1
TABLET, FILM COATED ORAL
Qty: 90 TABLET | Refills: 1 | Status: SHIPPED | OUTPATIENT
Start: 2024-02-16

## 2024-03-19 ENCOUNTER — OFFICE VISIT (OUTPATIENT)
Dept: PRIMARY CARE CLINIC | Age: 24
End: 2024-03-19
Payer: COMMERCIAL

## 2024-03-19 VITALS
HEART RATE: 100 BPM | BODY MASS INDEX: 28.73 KG/M2 | DIASTOLIC BLOOD PRESSURE: 80 MMHG | TEMPERATURE: 97.9 F | OXYGEN SATURATION: 98 % | SYSTOLIC BLOOD PRESSURE: 135 MMHG | RESPIRATION RATE: 18 BRPM | HEIGHT: 69 IN | WEIGHT: 194 LBS

## 2024-03-19 DIAGNOSIS — J01.20 ACUTE NON-RECURRENT ETHMOIDAL SINUSITIS: ICD-10-CM

## 2024-03-19 DIAGNOSIS — R63.1 POLYDIPSIA: Primary | ICD-10-CM

## 2024-03-19 DIAGNOSIS — H04.122 DRY EYE OF LEFT SIDE: ICD-10-CM

## 2024-03-19 DIAGNOSIS — Z13.1 DIABETES MELLITUS SCREENING: ICD-10-CM

## 2024-03-19 LAB — HBA1C MFR BLD: 5.2 %

## 2024-03-19 PROCEDURE — 1036F TOBACCO NON-USER: CPT | Performed by: FAMILY MEDICINE

## 2024-03-19 PROCEDURE — G8484 FLU IMMUNIZE NO ADMIN: HCPCS | Performed by: FAMILY MEDICINE

## 2024-03-19 PROCEDURE — 99213 OFFICE O/P EST LOW 20 MIN: CPT | Performed by: FAMILY MEDICINE

## 2024-03-19 PROCEDURE — 83036 HEMOGLOBIN GLYCOSYLATED A1C: CPT | Performed by: FAMILY MEDICINE

## 2024-03-19 PROCEDURE — G8419 CALC BMI OUT NRM PARAM NOF/U: HCPCS | Performed by: FAMILY MEDICINE

## 2024-03-19 PROCEDURE — G8427 DOCREV CUR MEDS BY ELIG CLIN: HCPCS | Performed by: FAMILY MEDICINE

## 2024-03-19 RX ORDER — ALBUTEROL SULFATE 90 UG/1
2 AEROSOL, METERED RESPIRATORY (INHALATION) EVERY 6 HOURS PRN
Qty: 3 EACH | Refills: 0 | Status: SHIPPED | OUTPATIENT
Start: 2024-03-19

## 2024-03-19 RX ORDER — ISOTRETINOIN 30 MG/1
30 CAPSULE ORAL DAILY
COMMUNITY
Start: 2024-03-03

## 2024-03-19 RX ORDER — POLYETHYLENE GLYCOL 400 AND PROPYLENE GLYCOL 4; 3 MG/ML; MG/ML
1 SOLUTION/ DROPS OPHTHALMIC PRN
Qty: 5 ML | Refills: 0 | Status: SHIPPED | OUTPATIENT
Start: 2024-03-19

## 2024-03-19 ASSESSMENT — ENCOUNTER SYMPTOMS
FOREIGN BODY SENSATION: 0
PHOTOPHOBIA: 0
EYE ITCHING: 0
EYE REDNESS: 0
DOUBLE VISION: 0
EYE DISCHARGE: 0
BLURRED VISION: 1

## 2024-03-19 NOTE — PROGRESS NOTES
Monik Gifford, a female of 23 y.o. came to the office 3/19/2024.     Patient Active Problem List   Diagnosis    Recurrent major depressive disorder, in remission (HCC)          Eye Problem   The left eye is affected. This is a new problem. The current episode started more than 1 month ago. The problem has been unchanged. There was no injury mechanism. The patient is experiencing no pain. There is No known exposure to pink eye. She Wears contacts. Associated symptoms include blurred vision (and cloudy.). Pertinent negatives include no eye discharge, double vision, eye redness, fever, foreign body sensation, itching, photophobia or recent URI. She has tried nothing for the symptoms.    endo: increase thirst lately and urination. Dad is diabetic.   - gets weekly immunotherapy shots with Dr. Carpenter for year long allergies.    Allergies   Allergen Reactions    Cat Hair Extract Itching     Other reaction(s): Other (See Comments)  sneezing    Egg-Derived Products Diarrhea    Horse-Derived Products Other (See Comments) and Swelling     Sneezing    Other Itching and Other (See Comments)     Sneezing  Cats  Other reaction(s): Other See Comments  sneezing    Pcn [Penicillins]     Seasonal Itching and Other (See Comments)     sneezing    Blueberry [Vaccinium Angustifolium] Hives    Food Hives     Pesto       Current Outpatient Medications on File Prior to Visit   Medication Sig Dispense Refill    ISOtretinoin (ACCUTANE) 30 MG chemo capsule Take 1 capsule by mouth daily      QUEtiapine (SEROQUEL) 300 MG tablet TAKE 1 TABLET NIGHTLY 90 tablet 1    lamoTRIgine (LAMICTAL) 150 MG tablet Take 1 tablet by mouth 2 times daily 180 tablet 0    fluticasone (FLONASE) 50 MCG/ACT nasal spray 1 spray by Each Nostril route daily as needed for Rhinitis (Patient not taking: Reported on 3/19/2024)       No current facility-administered medications on file prior to visit.       Review of Systems   Constitutional:  Negative for fever.   Eyes:

## 2024-08-28 ENCOUNTER — APPOINTMENT (OUTPATIENT)
Dept: CT IMAGING | Age: 24
End: 2024-08-28
Payer: COMMERCIAL

## 2024-08-28 ENCOUNTER — HOSPITAL ENCOUNTER (EMERGENCY)
Age: 24
Discharge: HOME | End: 2024-08-28
Payer: COMMERCIAL

## 2024-08-28 VITALS
TEMPERATURE: 98.4 F | OXYGEN SATURATION: 100 % | HEIGHT: 69 IN | WEIGHT: 185 LBS | BODY MASS INDEX: 27.4 KG/M2 | DIASTOLIC BLOOD PRESSURE: 68 MMHG | HEART RATE: 76 BPM | SYSTOLIC BLOOD PRESSURE: 107 MMHG | RESPIRATION RATE: 16 BRPM

## 2024-08-28 DIAGNOSIS — R19.7 NAUSEA, VOMITING, AND DIARRHEA: Primary | ICD-10-CM

## 2024-08-28 DIAGNOSIS — R10.84 GENERALIZED ABDOMINAL PAIN: ICD-10-CM

## 2024-08-28 DIAGNOSIS — R11.2 NAUSEA, VOMITING, AND DIARRHEA: Primary | ICD-10-CM

## 2024-08-28 LAB
ALBUMIN SERPL-MCNC: 4.4 G/DL (ref 3.5–5.2)
ALP SERPL-CCNC: 103 U/L (ref 35–104)
ALT SERPL-CCNC: 13 U/L (ref 0–32)
ANION GAP SERPL CALCULATED.3IONS-SCNC: 8 MMOL/L (ref 7–16)
AST SERPL-CCNC: 17 U/L (ref 0–31)
BACTERIA URNS QL MICRO: ABNORMAL
BASOPHILS # BLD: 0.05 K/UL (ref 0–0.2)
BASOPHILS NFR BLD: 1 % (ref 0–2)
BILIRUB SERPL-MCNC: 0.5 MG/DL (ref 0–1.2)
BILIRUB UR QL STRIP: NEGATIVE
BUN SERPL-MCNC: 7 MG/DL (ref 6–20)
CALCIUM SERPL-MCNC: 9.5 MG/DL (ref 8.6–10.2)
CHLORIDE SERPL-SCNC: 103 MMOL/L (ref 98–107)
CLARITY UR: ABNORMAL
CO2 SERPL-SCNC: 28 MMOL/L (ref 22–29)
COLOR UR: YELLOW
CREAT SERPL-MCNC: 0.7 MG/DL (ref 0.5–1)
EOSINOPHIL # BLD: 0.46 K/UL (ref 0.05–0.5)
EOSINOPHILS RELATIVE PERCENT: 7 % (ref 0–6)
EPI CELLS #/AREA URNS HPF: ABNORMAL /HPF
ERYTHROCYTE [DISTWIDTH] IN BLOOD BY AUTOMATED COUNT: 11.8 % (ref 11.5–15)
GFR, ESTIMATED: >90 ML/MIN/1.73M2
GLUCOSE SERPL-MCNC: 91 MG/DL (ref 74–99)
GLUCOSE UR STRIP-MCNC: NEGATIVE MG/DL
HCG, URINE, POC: NEGATIVE
HCT VFR BLD AUTO: 41 % (ref 34–48)
HGB BLD-MCNC: 13.8 G/DL (ref 11.5–15.5)
HGB UR QL STRIP.AUTO: ABNORMAL
IMM GRANULOCYTES # BLD AUTO: <0.03 K/UL (ref 0–0.58)
IMM GRANULOCYTES NFR BLD: 0 % (ref 0–5)
KETONES UR STRIP-MCNC: NEGATIVE MG/DL
LACTATE BLDV-SCNC: 0.7 MMOL/L (ref 0.5–2.2)
LEUKOCYTE ESTERASE UR QL STRIP: NEGATIVE
LIPASE SERPL-CCNC: 27 U/L (ref 13–60)
LYMPHOCYTES NFR BLD: 1.88 K/UL (ref 1.5–4)
LYMPHOCYTES RELATIVE PERCENT: 30 % (ref 20–42)
Lab: NORMAL
MCH RBC QN AUTO: 29.7 PG (ref 26–35)
MCHC RBC AUTO-ENTMCNC: 33.7 G/DL (ref 32–34.5)
MCV RBC AUTO: 88.4 FL (ref 80–99.9)
MONOCYTES NFR BLD: 0.43 K/UL (ref 0.1–0.95)
MONOCYTES NFR BLD: 7 % (ref 2–12)
MUCOUS THREADS URNS QL MICRO: PRESENT
NEGATIVE QC PASS/FAIL: NORMAL
NEUTROPHILS NFR BLD: 55 % (ref 43–80)
NEUTS SEG NFR BLD: 3.48 K/UL (ref 1.8–7.3)
NITRITE UR QL STRIP: NEGATIVE
PH UR STRIP: 5.5 [PH] (ref 5–9)
PLATELET # BLD AUTO: 262 K/UL (ref 130–450)
PMV BLD AUTO: 10.2 FL (ref 7–12)
POSITIVE QC PASS/FAIL: NORMAL
POTASSIUM SERPL-SCNC: 3.7 MMOL/L (ref 3.5–5)
PROT SERPL-MCNC: 8 G/DL (ref 6.4–8.3)
PROT UR STRIP-MCNC: NEGATIVE MG/DL
RBC # BLD AUTO: 4.64 M/UL (ref 3.5–5.5)
RBC #/AREA URNS HPF: ABNORMAL /HPF
SARS-COV-2 RDRP RESP QL NAA+PROBE: NOT DETECTED
SODIUM SERPL-SCNC: 139 MMOL/L (ref 132–146)
SP GR UR STRIP: >1.03 (ref 1–1.03)
SPECIMEN DESCRIPTION: NORMAL
UROBILINOGEN UR STRIP-ACNC: 0.2 EU/DL (ref 0–1)
WBC #/AREA URNS HPF: ABNORMAL /HPF
WBC OTHER # BLD: 6.3 K/UL (ref 4.5–11.5)

## 2024-08-28 PROCEDURE — 6360000002 HC RX W HCPCS: Performed by: PHYSICIAN ASSISTANT

## 2024-08-28 PROCEDURE — 74177 CT ABD & PELVIS W/CONTRAST: CPT

## 2024-08-28 PROCEDURE — 80053 COMPREHEN METABOLIC PANEL: CPT

## 2024-08-28 PROCEDURE — 87635 SARS-COV-2 COVID-19 AMP PRB: CPT

## 2024-08-28 PROCEDURE — 85025 COMPLETE CBC W/AUTO DIFF WBC: CPT

## 2024-08-28 PROCEDURE — 96374 THER/PROPH/DIAG INJ IV PUSH: CPT

## 2024-08-28 PROCEDURE — 83690 ASSAY OF LIPASE: CPT

## 2024-08-28 PROCEDURE — 83605 ASSAY OF LACTIC ACID: CPT

## 2024-08-28 PROCEDURE — 96375 TX/PRO/DX INJ NEW DRUG ADDON: CPT

## 2024-08-28 PROCEDURE — 6370000000 HC RX 637 (ALT 250 FOR IP): Performed by: PHYSICIAN ASSISTANT

## 2024-08-28 PROCEDURE — 2500000003 HC RX 250 WO HCPCS: Performed by: PHYSICIAN ASSISTANT

## 2024-08-28 PROCEDURE — 81001 URINALYSIS AUTO W/SCOPE: CPT

## 2024-08-28 PROCEDURE — 6360000004 HC RX CONTRAST MEDICATION: Performed by: RADIOLOGY

## 2024-08-28 PROCEDURE — 99285 EMERGENCY DEPT VISIT HI MDM: CPT

## 2024-08-28 PROCEDURE — 2580000003 HC RX 258: Performed by: PHYSICIAN ASSISTANT

## 2024-08-28 RX ORDER — IOPAMIDOL 755 MG/ML
75 INJECTION, SOLUTION INTRAVASCULAR
Status: COMPLETED | OUTPATIENT
Start: 2024-08-28 | End: 2024-08-28

## 2024-08-28 RX ORDER — ONDANSETRON 4 MG/1
4 TABLET, ORALLY DISINTEGRATING ORAL EVERY 8 HOURS PRN
Qty: 30 TABLET | Refills: 0 | Status: SHIPPED | OUTPATIENT
Start: 2024-08-28

## 2024-08-28 RX ORDER — 0.9 % SODIUM CHLORIDE 0.9 %
1000 INTRAVENOUS SOLUTION INTRAVENOUS ONCE
Status: COMPLETED | OUTPATIENT
Start: 2024-08-28 | End: 2024-08-28

## 2024-08-28 RX ORDER — DICYCLOMINE HYDROCHLORIDE 10 MG/1
20 CAPSULE ORAL ONCE
Status: COMPLETED | OUTPATIENT
Start: 2024-08-28 | End: 2024-08-28

## 2024-08-28 RX ORDER — ONDANSETRON 2 MG/ML
4 INJECTION INTRAMUSCULAR; INTRAVENOUS ONCE
Status: COMPLETED | OUTPATIENT
Start: 2024-08-28 | End: 2024-08-28

## 2024-08-28 RX ADMIN — IOPAMIDOL 75 ML: 755 INJECTION, SOLUTION INTRAVENOUS at 12:08

## 2024-08-28 RX ADMIN — ONDANSETRON 4 MG: 2 INJECTION INTRAMUSCULAR; INTRAVENOUS at 10:05

## 2024-08-28 RX ADMIN — SODIUM CHLORIDE 1000 ML: 9 INJECTION, SOLUTION INTRAVENOUS at 10:09

## 2024-08-28 RX ADMIN — DICYCLOMINE HYDROCHLORIDE 20 MG: 10 CAPSULE ORAL at 10:05

## 2024-08-28 RX ADMIN — FAMOTIDINE 20 MG: 10 INJECTION, SOLUTION INTRAVENOUS at 10:06

## 2024-08-28 ASSESSMENT — PAIN DESCRIPTION - FREQUENCY: FREQUENCY: CONTINUOUS

## 2024-08-28 ASSESSMENT — PAIN DESCRIPTION - LOCATION: LOCATION: ABDOMEN

## 2024-08-28 ASSESSMENT — PAIN SCALES - GENERAL: PAINLEVEL_OUTOF10: 8

## 2024-08-28 ASSESSMENT — PAIN - FUNCTIONAL ASSESSMENT
PAIN_FUNCTIONAL_ASSESSMENT: 0-10
PAIN_FUNCTIONAL_ASSESSMENT: PREVENTS OR INTERFERES SOME ACTIVE ACTIVITIES AND ADLS

## 2024-08-28 ASSESSMENT — PAIN DESCRIPTION - ONSET: ONSET: PROGRESSIVE

## 2024-08-28 ASSESSMENT — PAIN DESCRIPTION - ORIENTATION: ORIENTATION: LEFT

## 2024-08-28 ASSESSMENT — PAIN DESCRIPTION - DESCRIPTORS: DESCRIPTORS: SHARP

## 2024-08-28 NOTE — ED PROVIDER NOTES
[facility] Visit.           Department of Emergency Medicine   ED  Provider Note  Admit Date/RoomTime: 8/28/2024  9:09 AM  ED Room: DISPO/D02    Chief Complaint       Abdominal Pain (Nausea and vomiting)    History of Present Illness      Monik Gifford is a 24 y.o. old female who presents to the ED with generalized abdominal pain that began yesterday.  She states the pain comes in waves and feels like spasms.  It starts at her left side and radiates to the right.  She also reports nausea and vomiting that began yesterday.  She did have diarrhea this morning.  She denies chest pain, shortness of breath, or pain with breathing.  She has no cough or congestion.  She does report chills.  Patient states she does feel like she has difficulty urinating but denies any pain with doing so.  She is alert and oriented x 3 and in no apparent distress at this exam.  She is nontoxic-appearing.  Patient has surgical history of appendectomy and cholecystectomy in 2018.  Patient has history of IBS    PCP: Jb Nolasco DO    LMP: 8/11/2024    ROS   Pertinent positives and negatives are stated within HPI, all other systems reviewed and are negative.    Past Medical History:  has a past medical history of Abdominal pain, Anxiety, Asthma, Back pain, Depression, Headache, Insomnia, and Irritable bowel syndrome.    Past Surgical History:  has a past surgical history that includes Finger replantation; Colonoscopy; laparoscopic appendectomy (N/A, 12/04/2018); Cholecystectomy, laparoscopic (N/A, 12/04/2018); and Appendectomy.    Social History:  reports that she has never smoked. She has been exposed to tobacco smoke. She has never used smokeless tobacco. She reports that she does not drink alcohol and does not use drugs.    Family History: family history includes Arthritis in her mother; Depression in her mother.     The patient’s home medications have been reviewed.    Allergies: Cat hair extract, Egg-derived products,

## 2024-11-04 NOTE — TELEPHONE ENCOUNTER
Called patient last evening and left message if she is feeling dizzy and going to almost pass out then I recommended she go to emergency room Yes

## 2025-01-03 ENCOUNTER — OFFICE VISIT (OUTPATIENT)
Dept: PRIMARY CARE CLINIC | Age: 25
End: 2025-01-03

## 2025-01-03 VITALS
RESPIRATION RATE: 16 BRPM | SYSTOLIC BLOOD PRESSURE: 100 MMHG | WEIGHT: 203 LBS | HEART RATE: 100 BPM | TEMPERATURE: 98.4 F | BODY MASS INDEX: 30.07 KG/M2 | OXYGEN SATURATION: 97 % | DIASTOLIC BLOOD PRESSURE: 70 MMHG | HEIGHT: 69 IN

## 2025-01-03 DIAGNOSIS — Z00.00 ANNUAL PHYSICAL EXAM: Primary | ICD-10-CM

## 2025-01-03 DIAGNOSIS — F51.04 PSYCHOPHYSIOLOGICAL INSOMNIA: ICD-10-CM

## 2025-01-03 DIAGNOSIS — F33.40 RECURRENT MAJOR DEPRESSIVE DISORDER, IN REMISSION (HCC): ICD-10-CM

## 2025-01-03 RX ORDER — QUETIAPINE 150 MG/1
150 TABLET, FILM COATED, EXTENDED RELEASE ORAL DAILY
COMMUNITY

## 2025-01-03 RX ORDER — TRAZODONE HYDROCHLORIDE 50 MG/1
50 TABLET, FILM COATED ORAL NIGHTLY
Qty: 30 TABLET | Refills: 2 | Status: SHIPPED | OUTPATIENT
Start: 2025-01-03

## 2025-01-03 SDOH — ECONOMIC STABILITY: FOOD INSECURITY: WITHIN THE PAST 12 MONTHS, YOU WORRIED THAT YOUR FOOD WOULD RUN OUT BEFORE YOU GOT MONEY TO BUY MORE.: NEVER TRUE

## 2025-01-03 SDOH — ECONOMIC STABILITY: FOOD INSECURITY: WITHIN THE PAST 12 MONTHS, THE FOOD YOU BOUGHT JUST DIDN'T LAST AND YOU DIDN'T HAVE MONEY TO GET MORE.: NEVER TRUE

## 2025-01-03 SDOH — ECONOMIC STABILITY: INCOME INSECURITY: HOW HARD IS IT FOR YOU TO PAY FOR THE VERY BASICS LIKE FOOD, HOUSING, MEDICAL CARE, AND HEATING?: NOT HARD AT ALL

## 2025-01-03 ASSESSMENT — PATIENT HEALTH QUESTIONNAIRE - PHQ9
5. POOR APPETITE OR OVEREATING: NOT AT ALL
9. THOUGHTS THAT YOU WOULD BE BETTER OFF DEAD, OR OF HURTING YOURSELF: NOT AT ALL
SUM OF ALL RESPONSES TO PHQ9 QUESTIONS 1 & 2: 0
SUM OF ALL RESPONSES TO PHQ QUESTIONS 1-9: 0
SUM OF ALL RESPONSES TO PHQ QUESTIONS 1-9: 0
1. LITTLE INTEREST OR PLEASURE IN DOING THINGS: NOT AT ALL
8. MOVING OR SPEAKING SO SLOWLY THAT OTHER PEOPLE COULD HAVE NOTICED. OR THE OPPOSITE, BEING SO FIGETY OR RESTLESS THAT YOU HAVE BEEN MOVING AROUND A LOT MORE THAN USUAL: NOT AT ALL
SUM OF ALL RESPONSES TO PHQ QUESTIONS 1-9: 0
4. FEELING TIRED OR HAVING LITTLE ENERGY: NOT AT ALL
2. FEELING DOWN, DEPRESSED OR HOPELESS: NOT AT ALL
SUM OF ALL RESPONSES TO PHQ QUESTIONS 1-9: 0
3. TROUBLE FALLING OR STAYING ASLEEP: NOT AT ALL
6. FEELING BAD ABOUT YOURSELF - OR THAT YOU ARE A FAILURE OR HAVE LET YOURSELF OR YOUR FAMILY DOWN: NOT AT ALL
10. IF YOU CHECKED OFF ANY PROBLEMS, HOW DIFFICULT HAVE THESE PROBLEMS MADE IT FOR YOU TO DO YOUR WORK, TAKE CARE OF THINGS AT HOME, OR GET ALONG WITH OTHER PEOPLE: NOT DIFFICULT AT ALL
7. TROUBLE CONCENTRATING ON THINGS, SUCH AS READING THE NEWSPAPER OR WATCHING TELEVISION: NOT AT ALL

## 2025-01-03 NOTE — PROGRESS NOTES
SUBJECTIVE:    HPI: Monik Gifford  Was seen here today for   Chief Complaint   Patient presents with    Annual Exam    .  She states they are dealing with needing PE for  at [employer] Graduated from [employer] with degree in edu K-5.     Past Medical History:   Diagnosis Date    Abdominal pain     Anxiety     Asthma     Back pain     Depression     Headache     posterior, radiates to neck, all testing negative.    Insomnia     Irritable bowel syndrome        Past Surgical History:   Procedure Laterality Date    APPENDECTOMY      2018    CHOLECYSTECTOMY, LAPAROSCOPIC N/A 12/04/2018    CHOLECYSTECTOMY LAPAROSCOPIC POSSIBLE OPEN POSSIBLE GRAM performed by Kevon Ge MD at [facility] OR    COLONOSCOPY      x2    FINGER REPLANTATION      LAPAROSCOPIC APPENDECTOMY N/A 12/04/2018    APPENDECTOMY LAPAROSCOPIC POSSIBLE OPEN performed by Kevon Ge MD at [facility] OR       Family History   Problem Relation Age of Onset    Depression Mother     Arthritis Mother         RA       Prior to Admission medications    Medication Sig Start Date End Date Taking? Authorizing Provider   QUEtiapine (SEROQUEL XR) 150 MG TB24 extended release tablet Take 1 tablet by mouth daily   Yes Provider, MD Maricarmen   traZODone (DESYREL) 50 MG tablet Take 1 tablet by mouth nightly 1/3/25  Yes Jb Nolasco, DO   albuterol sulfate HFA (PROVENTIL HFA) 108 (90 Base) MCG/ACT inhaler Inhale 2 puffs into the lungs every 6 hours as needed for Wheezing 3/19/24  Yes Jb Nolasco, DO   fluticasone (FLONASE) 50 MCG/ACT nasal spray 1 spray by Each Nostril route daily as needed for Rhinitis 11/19/23  Yes ProviderMaricarmen MD       Cat hair extract, Egg-derived products, Horse-derived products, Other, Pcn [penicillins], Seasonal, Blueberry [vaccinium angustifolium], and Food    Social History     Tobacco Use    Smoking status: Never     Passive exposure: Yes    Smokeless tobacco: Never   Substance Use Topics    Alcohol use: No

## 2025-01-28 ENCOUNTER — TELEPHONE (OUTPATIENT)
Dept: PRIMARY CARE CLINIC | Age: 25
End: 2025-01-28

## 2025-01-28 NOTE — TELEPHONE ENCOUNTER
Pt called today, asked that CE call her, hopefully today.   Said she is having back pain, asking for direction on what to do - meds,er,..    Ph# [phone]

## 2025-01-29 ENCOUNTER — HOSPITAL ENCOUNTER (EMERGENCY)
Age: 25
Discharge: HOME | End: 2025-01-29
Payer: COMMERCIAL

## 2025-01-29 ENCOUNTER — APPOINTMENT (OUTPATIENT)
Dept: CT IMAGING | Age: 25
End: 2025-01-29
Payer: COMMERCIAL

## 2025-01-29 VITALS
TEMPERATURE: 97.9 F | RESPIRATION RATE: 16 BRPM | DIASTOLIC BLOOD PRESSURE: 64 MMHG | WEIGHT: 190 LBS | HEART RATE: 94 BPM | SYSTOLIC BLOOD PRESSURE: 105 MMHG | BODY MASS INDEX: 28.14 KG/M2 | HEIGHT: 69 IN | OXYGEN SATURATION: 97 %

## 2025-01-29 DIAGNOSIS — M79.604 LOW BACK PAIN RADIATING TO BOTH LEGS: ICD-10-CM

## 2025-01-29 DIAGNOSIS — M54.50 LOW BACK PAIN RADIATING TO BOTH LEGS: ICD-10-CM

## 2025-01-29 DIAGNOSIS — M51.369 BULGING LUMBAR DISC: Primary | ICD-10-CM

## 2025-01-29 DIAGNOSIS — M48.061 SPINAL STENOSIS OF LUMBAR REGION WITHOUT NEUROGENIC CLAUDICATION: ICD-10-CM

## 2025-01-29 DIAGNOSIS — M79.605 LOW BACK PAIN RADIATING TO BOTH LEGS: ICD-10-CM

## 2025-01-29 LAB
HCG, URINE, POC: NEGATIVE
Lab: NORMAL
NEGATIVE QC PASS/FAIL: NORMAL
POSITIVE QC PASS/FAIL: NORMAL

## 2025-01-29 PROCEDURE — 6370000000 HC RX 637 (ALT 250 FOR IP): Performed by: PHYSICIAN ASSISTANT

## 2025-01-29 PROCEDURE — 99284 EMERGENCY DEPT VISIT MOD MDM: CPT

## 2025-01-29 PROCEDURE — 6360000002 HC RX W HCPCS: Performed by: PHYSICIAN ASSISTANT

## 2025-01-29 PROCEDURE — 96372 THER/PROPH/DIAG INJ SC/IM: CPT

## 2025-01-29 PROCEDURE — 72131 CT LUMBAR SPINE W/O DYE: CPT

## 2025-01-29 RX ORDER — DEXAMETHASONE SODIUM PHOSPHATE 10 MG/ML
10 INJECTION INTRAMUSCULAR; INTRAVENOUS ONCE
Status: COMPLETED | OUTPATIENT
Start: 2025-01-29 | End: 2025-01-29

## 2025-01-29 RX ORDER — PREDNISONE 20 MG/1
TABLET ORAL
Qty: 18 TABLET | Refills: 0 | Status: SHIPPED | OUTPATIENT
Start: 2025-01-29 | End: 2025-02-08

## 2025-01-29 RX ORDER — TIZANIDINE 2 MG/1
2 TABLET ORAL EVERY 6 HOURS PRN
COMMUNITY

## 2025-01-29 RX ORDER — DIAZEPAM 2 MG/1
2 TABLET ORAL ONCE
Status: COMPLETED | OUTPATIENT
Start: 2025-01-29 | End: 2025-01-29

## 2025-01-29 RX ORDER — NAPROXEN 500 MG/1
500 TABLET ORAL 2 TIMES DAILY
Qty: 60 TABLET | Refills: 0 | Status: SHIPPED | OUTPATIENT
Start: 2025-01-29

## 2025-01-29 RX ORDER — HYDROCODONE BITARTRATE AND ACETAMINOPHEN 5; 325 MG/1; MG/1
1 TABLET ORAL EVERY 6 HOURS PRN
Qty: 20 TABLET | Refills: 0 | Status: SHIPPED | OUTPATIENT
Start: 2025-01-29 | End: 2025-02-03

## 2025-01-29 RX ADMIN — DEXAMETHASONE SODIUM PHOSPHATE 10 MG: 10 INJECTION INTRAMUSCULAR; INTRAVENOUS at 15:04

## 2025-01-29 RX ADMIN — DIAZEPAM 2 MG: 2 TABLET ORAL at 15:02

## 2025-01-29 RX ADMIN — HYDROMORPHONE HYDROCHLORIDE 0.5 MG: 1 INJECTION, SOLUTION INTRAMUSCULAR; INTRAVENOUS; SUBCUTANEOUS at 15:04

## 2025-01-29 ASSESSMENT — PAIN DESCRIPTION - LOCATION: LOCATION: BACK;LEG

## 2025-01-29 ASSESSMENT — PAIN DESCRIPTION - ORIENTATION: ORIENTATION: RIGHT;LEFT;LOWER

## 2025-01-29 ASSESSMENT — PAIN DESCRIPTION - DESCRIPTORS: DESCRIPTORS: SPASM;SHARP

## 2025-01-29 ASSESSMENT — PAIN SCALES - GENERAL: PAINLEVEL_OUTOF10: 10

## 2025-01-29 NOTE — DISCHARGE INSTRUCTIONS
CT LUMBAR SPINE WO CONTRAST   Final Result   1. No acute osseous abnormality of the lumbar spine.   2. 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

## 2025-02-06 ENCOUNTER — TELEPHONE (OUTPATIENT)
Dept: PRIMARY CARE CLINIC | Age: 25
End: 2025-02-06

## 2025-02-06 DIAGNOSIS — M54.50 ACUTE LOW BACK PAIN, UNSPECIFIED BACK PAIN LATERALITY, UNSPECIFIED WHETHER SCIATICA PRESENT: Primary | ICD-10-CM

## 2025-02-06 NOTE — TELEPHONE ENCOUNTER
Physical therapy ordered for patient for continued low back pain at [facility] at her request.  See back specialist as scheduled.

## 2025-02-06 NOTE — TELEPHONE ENCOUNTER
Patient wants a script for PT.  She was seen in ED 01/29/2025 and told to seek physical therapy for her back.  She has made an apt with [facility] specialist.  Patient is at [facility] now.  [facility] needs a script for PT for back pain.    Fax#[phone]      Please advise  Thank you

## 2025-02-26 DIAGNOSIS — F33.40 RECURRENT MAJOR DEPRESSIVE DISORDER, IN REMISSION: ICD-10-CM

## 2025-02-26 DIAGNOSIS — F41.1 GAD (GENERALIZED ANXIETY DISORDER): ICD-10-CM

## 2025-02-26 RX ORDER — QUETIAPINE FUMARATE 300 MG/1
TABLET, FILM COATED ORAL
Qty: 90 TABLET | Refills: 1 | OUTPATIENT
Start: 2025-02-26

## 2025-03-11 ENCOUNTER — TELEPHONE (OUTPATIENT)
Dept: PRIMARY CARE CLINIC | Age: 25
End: 2025-03-11

## 2025-04-07 DIAGNOSIS — F51.04 PSYCHOPHYSIOLOGICAL INSOMNIA: ICD-10-CM

## 2025-04-08 RX ORDER — TRAZODONE HYDROCHLORIDE 50 MG/1
50 TABLET ORAL NIGHTLY
Qty: 30 TABLET | Refills: 2 | Status: SHIPPED | OUTPATIENT
Start: 2025-04-08

## 2025-04-08 NOTE — TELEPHONE ENCOUNTER
Name of Medication(s) Requested:  Requested Prescriptions     Pending Prescriptions Disp Refills    traZODone (DESYREL) 50 MG tablet [Pharmacy Med Name: TRAZODONE 50 MG TABLET] 30 tablet 2     Sig: TAKE 1 TABLET BY MOUTH EVERY DAY AT NIGHT       Medication is on current medication list Yes    Dosage and directions were verified? Yes    Quantity verified: 30 day supply     Pharmacy Verified?  Yes    Last Appointment:  1/3/2025    Future appts:  Future Appointments   Date Time Provider Department Center   7/7/2025  9:00 AM Jb Nolasco DO [facility] DEP        (If no appt send self scheduling link. .REFILLAPPT)  Scheduling request sent?     [] Yes  [x] No    Does patient need updated?  [] Yes  [x] No

## 2025-04-30 ENCOUNTER — TELEPHONE (OUTPATIENT)
Dept: PRIMARY CARE CLINIC | Age: 25
End: 2025-04-30

## 2025-04-30 RX ORDER — QUETIAPINE 150 MG/1
150 TABLET, FILM COATED, EXTENDED RELEASE ORAL DAILY
Qty: 30 TABLET | Refills: 2 | Status: SHIPPED | OUTPATIENT
Start: 2025-04-30

## 2025-04-30 NOTE — TELEPHONE ENCOUNTER
Monik needs a refill on her 150 mg Quetiapine    PLEASE MAKE SURE ITS  MG    TO [facility] [address] IN [place]      Thank you

## 2025-05-13 ENCOUNTER — TELEPHONE (OUTPATIENT)
Dept: PRIMARY CARE CLINIC | Age: 25
End: 2025-05-13

## 2025-05-13 NOTE — TELEPHONE ENCOUNTER
Pt called in wanting Dr Nolasco to prescribe her an antibiotic for a possible sinus infection. I did let the pt that Dr Nolasco would need to see her before prescribing anything     I told unfortunately Dr Nolasco does not have anything available until Friday.    Her symptoms have been going on since Saturday. Congestion, post nasal drip, runny nose, bad migraines, no fever, coughing. She stated that yesterday she could start hearing a cackling noise while breathing.     Has tried OTC medicaiton: Sudafed and Muicnex. She does get weekly allergy shots with Dr Carpenter. She also stated that is she prone to getting sinus infections.     I did let her know that we do have a walk in care in our office that she could be seen today. They are open until 5 pm, but she would see the NP. No appt needed either.    She stated that she would mostly likely do the walk in care so she can get on something before the next opening with Dr Nolasco.

## 2025-05-14 ENCOUNTER — OFFICE VISIT (OUTPATIENT)
Age: 25
End: 2025-05-14
Payer: COMMERCIAL

## 2025-05-14 VITALS
HEART RATE: 101 BPM | DIASTOLIC BLOOD PRESSURE: 70 MMHG | RESPIRATION RATE: 17 BRPM | OXYGEN SATURATION: 98 % | BODY MASS INDEX: 31.9 KG/M2 | TEMPERATURE: 97.8 F | SYSTOLIC BLOOD PRESSURE: 112 MMHG | WEIGHT: 216 LBS

## 2025-05-14 DIAGNOSIS — R09.81 NASAL CONGESTION: Primary | ICD-10-CM

## 2025-05-14 DIAGNOSIS — R05.1 ACUTE COUGH: ICD-10-CM

## 2025-05-14 PROCEDURE — 1036F TOBACCO NON-USER: CPT

## 2025-05-14 PROCEDURE — G8427 DOCREV CUR MEDS BY ELIG CLIN: HCPCS

## 2025-05-14 PROCEDURE — 99213 OFFICE O/P EST LOW 20 MIN: CPT

## 2025-05-14 PROCEDURE — G8417 CALC BMI ABV UP PARAM F/U: HCPCS

## 2025-05-14 NOTE — PROGRESS NOTES
Chief Complaint       Congestion (Started Saturday/Nasal congestion- yellow /Bilateral ear pain /HA /Cough: productive- clear /Wheezing /Vomited mucus Monday /(-) Nausea /(-) Diarrhea /(-) fever/OTC: Sudafed, mucinex, excedrin /Declined swabbing )      History of Present Illness   Source of history provided by:  patient.      Monik Gifford is a 25 y.o. old female presenting to the walk in clinic for evaluation of sinus pressure, nasal congestion, yellow to clear nasal drainage, bilateral ear pressure, mild clear productive cough and sore throat  and intermittent x 4 days. Has been taking Sudafed, Excedrin and Mucinex OTC without relief. Denies any fever, chills, CP, SOB, or GI symptoms. Reports hx of asthma. Patient has seasonal allergies and takes Flovent BID. She does see an allergist and gets weekly allergy shots.    ROS    Unless otherwise stated in this report or unable to obtain because of the patient's clinical or mental status as evidenced by the medical record, this patients's positive and negative responses for Review of Systems, constitutional, psych, eyes, ENT, cardiovascular, respiratory, gastrointestinal, neurological, genitourinary, musculoskeletal, integument systems and systems related to the presenting problem are either stated in the preceding or were not pertinent or were negative for the symptoms and/or complaints related to the medical problem.    Physical Exam         VS:  /70   Pulse (!) 101   Temp 97.8 °F (36.6 °C) (Temporal)   Resp 17   Wt 98 kg (216 lb)   LMP 04/23/2025 (Approximate)   SpO2 98%   BMI 31.90 kg/m²    Oxygen Saturation Interpretation: Normal.    Constitutional:  Alert, development consistent with age.  Head: mild TTP over the maxillary sinuses.  Ears:  External Ears: Bilateral pinna normal. TMs clear without erythema or perforation bilaterally.  Canals normal bilaterally without swelling or exudate  Nose:  Moderate congestion of the nasal mucosa. There is

## 2025-07-31 RX ORDER — QUETIAPINE 150 MG/1
150 TABLET, FILM COATED, EXTENDED RELEASE ORAL DAILY
Qty: 30 TABLET | Refills: 0 | Status: SHIPPED | OUTPATIENT
Start: 2025-07-31

## (undated) DEVICE — SYRINGE MED 20ML STD CLR PLAS LUERLOCK TIP N CTRL DISP

## (undated) DEVICE — TROCAR ENDOSCP L100MM DIA5MM DIL TIP STBL SL W OPTVW

## (undated) DEVICE — SOLUTION IV IRRIG POUR BRL 0.9% SODIUM CHL 2F7124

## (undated) DEVICE — TROCAR ENDOSCP L100MM DIA12MM DIL TIP OBT RADLUC STBL SL

## (undated) DEVICE — TOTAL TRAY, DB, 100% SILI FOLEY, 16FR 10: Brand: MEDLINE

## (undated) DEVICE — GOWN,SIRUS,FABRNF,L,20/CS: Brand: MEDLINE

## (undated) DEVICE — SEALER TISS L35CM ADV BPLR STR TIP LAP APPRCH ENSEAL G2

## (undated) DEVICE — CHLORAPREP 26ML ORANGE

## (undated) DEVICE — COVER HNDL LT DISP

## (undated) DEVICE — APPLIER CLP M/L SHFT DIA5MM 15 LIG LIGAMAX 5

## (undated) DEVICE — SUTURE VCRL SZ 2-0 L27IN ABSRB UD L26MM SH 1/2 CIR J417H

## (undated) DEVICE — Device

## (undated) DEVICE — PATIENT RETURN ELECTRODE, SINGLE-USE, CONTACT QUALITY MONITORING, ADULT, WITH 9FT CORD, FOR PATIENTS WEIGING OVER 33LBS. (15KG): Brand: MEGADYNE

## (undated) DEVICE — GOWN,SIRUS,FABRNF,XL,20/CS: Brand: MEDLINE

## (undated) DEVICE — CUTTER ENDOSCP L340MM LIN ARTC SGL STROKE FIRING ENDOPATH

## (undated) DEVICE — SKIN AFFIX SURG ADHESIVE 72/CS 0.55ML: Brand: MEDLINE

## (undated) DEVICE — DOUBLE BASIN SET: Brand: MEDLINE INDUSTRIES, INC.

## (undated) DEVICE — INTENDED FOR TISSUE SEPARATION, AND OTHER PROCEDURES THAT REQUIRE A SHARP SURGICAL BLADE TO PUNCTURE OR CUT.: Brand: BARD-PARKER ® STAINLESS STEEL BLADES

## (undated) DEVICE — 4-PORT MANIFOLD: Brand: NEPTUNE 2

## (undated) DEVICE — KIT,ANTI FOG,W/SPONGE & FLUID,SOFT PACK: Brand: MEDLINE

## (undated) DEVICE — STANDARD HYPODERMIC NEEDLE,ALUMINUM HUB: Brand: MONOJECT

## (undated) DEVICE — SCISSORS ENDOSCP DIA5MM CRV MPLR CAUT W/ RATCH HNDL

## (undated) DEVICE — SOLUTION IRRIG 2000ML 0.9% SOD CHL USP UROMATIC PLAS CONT

## (undated) DEVICE — RELOAD STPL SZ 0 L45MM DIA3.5MM 0DEG STD REG TISS BLU TI

## (undated) DEVICE — TROCAR ENDOSCP L100MM DIA5MM BLDELSS STBL SL OBT RADLUC

## (undated) DEVICE — PUMP SUC IRR TBNG L10FT W/ HNDPC ASSEMB STRYKEFLOW 2

## (undated) DEVICE — BLADE CLIPPER GEN PURP NS

## (undated) DEVICE — PACK PROCEDURE SURG GEN CUST

## (undated) DEVICE — TOWEL,OR,DSP,ST,BLUE,STD,6/PK,12PK/CS: Brand: MEDLINE

## (undated) DEVICE — DRAPE,CHEST,FENES,15X10,STERIL: Brand: MEDLINE

## (undated) DEVICE — NEEDLE INSUF L120MM DIA2MM DISP FOR PNEUMOPERI ENDOPATH

## (undated) DEVICE — INSUFFLATION TUBING SET WITH FILTER, FUNNEL CONNECTOR AND LUER LOCK: Brand: JOSNOE MEDICAL INC